# Patient Record
Sex: FEMALE | Race: WHITE | NOT HISPANIC OR LATINO | Employment: OTHER | URBAN - NONMETROPOLITAN AREA
[De-identification: names, ages, dates, MRNs, and addresses within clinical notes are randomized per-mention and may not be internally consistent; named-entity substitution may affect disease eponyms.]

---

## 2017-01-30 ENCOUNTER — DOCTOR'S OFFICE (OUTPATIENT)
Dept: URBAN - NONMETROPOLITAN AREA CLINIC 1 | Facility: CLINIC | Age: 67
Setting detail: OPHTHALMOLOGY
End: 2017-01-30
Payer: COMMERCIAL

## 2017-01-30 DIAGNOSIS — H35.3132: ICD-10-CM

## 2017-01-30 DIAGNOSIS — H40.053: ICD-10-CM

## 2017-01-30 DIAGNOSIS — H43.812: ICD-10-CM

## 2017-01-30 DIAGNOSIS — H02.015: ICD-10-CM

## 2017-01-30 DIAGNOSIS — H35.412: ICD-10-CM

## 2017-01-30 DIAGNOSIS — H35.343: ICD-10-CM

## 2017-01-30 DIAGNOSIS — H02.014: ICD-10-CM

## 2017-01-30 DIAGNOSIS — H43.811: ICD-10-CM

## 2017-01-30 PROCEDURE — 92134 CPTRZ OPH DX IMG PST SGM RTA: CPT | Performed by: OPHTHALMOLOGY

## 2017-01-30 PROCEDURE — 92226 OPHTHALMOSCOPY EXT SUBSEQUENT: CPT | Performed by: OPHTHALMOLOGY

## 2017-01-30 PROCEDURE — 92014 COMPRE OPH EXAM EST PT 1/>: CPT | Performed by: OPHTHALMOLOGY

## 2017-01-30 ASSESSMENT — REFRACTION_MANIFEST
OD_SPHERE: PLANO
OD_CYLINDER: -1.50
OD_VA1: 20/
OD_AXIS: 060
OU_VA: 20/
OD_VA3: 20/
OS_VA1: 20/
OD_VA3: 20/
OD_ADD: +2.50
OD_VA1: 20/
OS_VA2: 20/
OU_VA: 20/
OD_VA3: 20/
OS_CYLINDER: -0.75
OD_VA2: 20/
OD_VA1: 20/25
OS_AXIS: 100
OU_VA: 20/
OD_VA2: 20/
OD_VA2: 20/25
OS_VA3: 20/
OS_SPHERE: -0.25
OS_VA3: 20/
OS_VA1: 20/25
OS_ADD: +2.50
OS_VA2: 20/25
OS_VA2: 20/
OS_VA1: 20/
OS_VA3: 20/

## 2017-01-30 ASSESSMENT — REFRACTION_AUTOREFRACTION
OD_CYLINDER: -1.75
OD_SPHERE: 0.00
OS_CYLINDER: -1.00
OS_AXIS: 102
OD_AXIS: 60
OS_SPHERE: 0.00

## 2017-01-30 ASSESSMENT — REFRACTION_CURRENTRX
OD_OVR_VA: 20/
OS_AXIS: 10
OD_SPHERE: PLANO
OD_OVR_VA: 20/
OS_OVR_VA: 20/
OS_OVR_VA: 20/
OS_SPHERE: -0.75
OD_VPRISM_DIRECTION: SV
OS_VPRISM_DIRECTION: SV
OS_CYLINDER: -0.50
OD_OVR_VA: 20/
OD_CYLINDER: -1.00
OS_OVR_VA: 20/
OD_AXIS: 045

## 2017-01-30 ASSESSMENT — SPHEQUIV_DERIVED
OS_SPHEQUIV: -0.625
OD_SPHEQUIV: -0.875
OS_SPHEQUIV: -0.5

## 2017-01-30 ASSESSMENT — CONFRONTATIONAL VISUAL FIELD TEST (CVF)
OD_FINDINGS: FULL
OS_FINDINGS: FULL

## 2017-01-30 ASSESSMENT — VISUAL ACUITY
OD_BCVA: 20/30-2
OS_BCVA: 20/25+1

## 2017-03-13 ENCOUNTER — DOCTOR'S OFFICE (OUTPATIENT)
Dept: URBAN - NONMETROPOLITAN AREA CLINIC 1 | Facility: CLINIC | Age: 67
Setting detail: OPHTHALMOLOGY
End: 2017-03-13
Payer: COMMERCIAL

## 2017-03-13 DIAGNOSIS — H52.4: ICD-10-CM

## 2017-03-13 DIAGNOSIS — H52.223: ICD-10-CM

## 2017-03-13 PROCEDURE — 92015 DETERMINE REFRACTIVE STATE: CPT | Performed by: OPTOMETRIST

## 2017-03-13 ASSESSMENT — REFRACTION_AUTOREFRACTION
OS_SPHERE: -1.00
OD_CYLINDER: -1.25
OS_AXIS: 74
OS_CYLINDER: -0.75
OD_AXIS: 54
OD_SPHERE: -0.25

## 2017-03-13 ASSESSMENT — REFRACTION_MANIFEST
OD_VA2: 20/
OS_VA2: 20/
OD_VA3: 20/
OD_VA1: 20/
OS_VA1: 20/
OS_VA3: 20/
OS_VA3: 20/
OU_VA: 20/
OS_VA1: 20/
OD_VA3: 20/
OS_VA2: 20/
OD_VA2: 20/
OD_VA1: 20/
OU_VA: 20/

## 2017-03-13 ASSESSMENT — REFRACTION_OUTSIDERX
OD_AXIS: 060
OS_AXIS: 100
OS_VA1: 20/25
OS_SPHERE: -0.25
OS_ADD: +2.50
OD_VA1: 20/25
OD_VA3: 20/
OD_CYLINDER: -1.50
OD_VA2: 20/25
OS_VA2: 20/25
OD_ADD: +2.50
OD_SPHERE: PLANO
OU_VA: 20/25
OS_VA3: 20/
OS_CYLINDER: -0.75

## 2017-03-13 ASSESSMENT — REFRACTION_CURRENTRX
OD_OVR_VA: 20/
OS_OVR_VA: 20/
OD_AXIS: 64
OD_SPHERE: PLANO
OD_OVR_VA: 20/
OD_OVR_VA: 20/
OD_VPRISM_DIRECTION: SV
OS_OVR_VA: 20/
OS_CYLINDER: -0.75
OS_OVR_VA: 20/
OS_SPHERE: -0.25
OS_AXIS: 100
OD_CYLINDER: -1.50
OS_VPRISM_DIRECTION: SV

## 2017-03-13 ASSESSMENT — SPHEQUIV_DERIVED
OS_SPHEQUIV: -1.375
OD_SPHEQUIV: -0.875

## 2017-03-13 ASSESSMENT — VISUAL ACUITY
OD_BCVA: 20/25
OS_BCVA: 20/20-2

## 2017-05-01 ENCOUNTER — OPTICAL OFFICE (OUTPATIENT)
Dept: URBAN - NONMETROPOLITAN AREA CLINIC 4 | Facility: CLINIC | Age: 67
Setting detail: OPHTHALMOLOGY
End: 2017-05-01
Payer: COMMERCIAL

## 2017-05-01 DIAGNOSIS — H52.223: ICD-10-CM

## 2017-05-01 PROCEDURE — V2025 EYEGLASSES DELUX FRAMES: HCPCS | Performed by: OPTOMETRIST

## 2017-05-01 PROCEDURE — V2103 SPHEROCYLINDR 4.00D/12-2.00D: HCPCS | Performed by: OPTOMETRIST

## 2017-05-01 PROCEDURE — V2020 VISION SVCS FRAMES PURCHASES: HCPCS | Performed by: OPTOMETRIST

## 2017-05-01 PROCEDURE — V2762 POLARIZATION, ANY LENS: HCPCS | Performed by: OPTOMETRIST

## 2017-06-05 ENCOUNTER — DOCTOR'S OFFICE (OUTPATIENT)
Dept: URBAN - NONMETROPOLITAN AREA CLINIC 1 | Facility: CLINIC | Age: 67
Setting detail: OPHTHALMOLOGY
End: 2017-06-05
Payer: COMMERCIAL

## 2017-06-05 DIAGNOSIS — H40.053: ICD-10-CM

## 2017-06-05 DIAGNOSIS — H35.412: ICD-10-CM

## 2017-06-05 DIAGNOSIS — H35.3132: ICD-10-CM

## 2017-06-05 DIAGNOSIS — H43.811: ICD-10-CM

## 2017-06-05 DIAGNOSIS — H43.812: ICD-10-CM

## 2017-06-05 DIAGNOSIS — H35.343: ICD-10-CM

## 2017-06-05 PROCEDURE — 92014 COMPRE OPH EXAM EST PT 1/>: CPT | Performed by: OPHTHALMOLOGY

## 2017-06-05 PROCEDURE — 92226 OPHTHALMOSCOPY EXT SUBSEQUENT: CPT | Performed by: OPHTHALMOLOGY

## 2017-06-05 ASSESSMENT — REFRACTION_AUTOREFRACTION
OD_CYLINDER: -1.25
OD_AXIS: 54
OS_AXIS: 74
OS_CYLINDER: -0.75
OD_SPHERE: -0.25
OS_SPHERE: -1.00

## 2017-06-05 ASSESSMENT — REFRACTION_MANIFEST
OS_VA3: 20/
OS_VA1: 20/
OD_VA3: 20/
OD_VA2: 20/
OS_VA3: 20/
OD_VA1: 20/
OS_VA1: 20/
OS_VA2: 20/
OD_VA2: 20/
OS_VA2: 20/
OU_VA: 20/
OU_VA: 20/
OD_VA3: 20/
OD_VA1: 20/

## 2017-06-05 ASSESSMENT — REFRACTION_OUTSIDERX
OS_ADD: +2.50
OS_VA3: 20/
OD_CYLINDER: -1.50
OS_AXIS: 100
OD_VA3: 20/
OD_VA1: 20/25
OS_CYLINDER: -0.75
OD_VA2: 20/25
OS_SPHERE: -0.25
OU_VA: 20/25
OD_SPHERE: PLANO
OS_VA1: 20/25
OD_ADD: +2.50
OS_VA2: 20/25
OD_AXIS: 060

## 2017-06-05 ASSESSMENT — REFRACTION_CURRENTRX
OD_SPHERE: PLANO
OD_OVR_VA: 20/
OD_OVR_VA: 20/
OD_AXIS: 64
OS_VPRISM_DIRECTION: SV
OS_CYLINDER: -0.75
OD_OVR_VA: 20/
OS_OVR_VA: 20/
OD_CYLINDER: -1.50
OS_AXIS: 100
OD_VPRISM_DIRECTION: SV
OS_OVR_VA: 20/
OS_OVR_VA: 20/
OS_SPHERE: -0.25

## 2017-06-05 ASSESSMENT — SPHEQUIV_DERIVED
OS_SPHEQUIV: -1.375
OD_SPHEQUIV: -0.875

## 2017-06-05 ASSESSMENT — VISUAL ACUITY
OS_BCVA: 20/20-1
OD_BCVA: 20/25-2

## 2017-06-05 ASSESSMENT — CONFRONTATIONAL VISUAL FIELD TEST (CVF)
OD_FINDINGS: FULL
OS_FINDINGS: FULL

## 2017-10-09 ENCOUNTER — DOCTOR'S OFFICE (OUTPATIENT)
Dept: URBAN - NONMETROPOLITAN AREA CLINIC 1 | Facility: CLINIC | Age: 67
Setting detail: OPHTHALMOLOGY
End: 2017-10-09
Payer: COMMERCIAL

## 2017-10-09 DIAGNOSIS — H40.003: ICD-10-CM

## 2017-10-09 PROCEDURE — 92082 INTERMEDIATE VISUAL FIELD XM: CPT | Performed by: OPHTHALMOLOGY

## 2017-10-16 ENCOUNTER — DOCTOR'S OFFICE (OUTPATIENT)
Dept: URBAN - NONMETROPOLITAN AREA CLINIC 1 | Facility: CLINIC | Age: 67
Setting detail: OPHTHALMOLOGY
End: 2017-10-16
Payer: COMMERCIAL

## 2017-10-16 DIAGNOSIS — H35.343: ICD-10-CM

## 2017-10-16 DIAGNOSIS — H35.412: ICD-10-CM

## 2017-10-16 DIAGNOSIS — H43.811: ICD-10-CM

## 2017-10-16 DIAGNOSIS — H35.3132: ICD-10-CM

## 2017-10-16 DIAGNOSIS — H40.053: ICD-10-CM

## 2017-10-16 DIAGNOSIS — H43.812: ICD-10-CM

## 2017-10-16 DIAGNOSIS — H43.813: ICD-10-CM

## 2017-10-16 PROCEDURE — 92226 OPHTHALMOSCOPY EXT SUBSEQUENT: CPT | Performed by: OPHTHALMOLOGY

## 2017-10-16 PROCEDURE — 92014 COMPRE OPH EXAM EST PT 1/>: CPT | Performed by: OPHTHALMOLOGY

## 2017-10-16 PROCEDURE — 92134 CPTRZ OPH DX IMG PST SGM RTA: CPT | Performed by: OPHTHALMOLOGY

## 2017-10-16 ASSESSMENT — REFRACTION_OUTSIDERX
OS_VA2: 20/25
OD_VA2: 20/25
OS_ADD: +2.50
OU_VA: 20/25
OD_AXIS: 060
OD_SPHERE: PLANO
OS_VA3: 20/
OD_ADD: +2.50
OS_AXIS: 100
OD_CYLINDER: -1.50
OD_VA1: 20/25
OS_CYLINDER: -0.75
OS_VA1: 20/25
OD_VA3: 20/
OS_SPHERE: -0.25

## 2017-10-16 ASSESSMENT — REFRACTION_MANIFEST
OD_VA1: 20/
OD_VA2: 20/
OS_VA3: 20/
OD_VA3: 20/
OS_VA2: 20/
OS_VA1: 20/
OD_VA2: 20/
OS_VA1: 20/
OD_VA3: 20/
OU_VA: 20/
OS_VA2: 20/
OS_VA3: 20/
OD_VA1: 20/
OU_VA: 20/

## 2017-10-16 ASSESSMENT — REFRACTION_CURRENTRX
OS_SPHERE: -0.25
OS_VPRISM_DIRECTION: SV
OD_CYLINDER: -1.50
OS_CYLINDER: -0.75
OD_OVR_VA: 20/
OD_OVR_VA: 20/
OD_SPHERE: PLANO
OD_VPRISM_DIRECTION: SV
OS_OVR_VA: 20/
OS_AXIS: 100
OS_OVR_VA: 20/
OD_AXIS: 64
OD_OVR_VA: 20/
OS_OVR_VA: 20/

## 2017-10-16 ASSESSMENT — SPHEQUIV_DERIVED
OS_SPHEQUIV: -1.375
OD_SPHEQUIV: -0.875

## 2017-10-16 ASSESSMENT — REFRACTION_AUTOREFRACTION
OS_AXIS: 74
OD_AXIS: 54
OD_CYLINDER: -1.25
OS_SPHERE: -1.00
OS_CYLINDER: -0.75
OD_SPHERE: -0.25

## 2017-10-16 ASSESSMENT — VISUAL ACUITY
OD_BCVA: 20/25-2
OS_BCVA: 20/25

## 2017-10-16 ASSESSMENT — CONFRONTATIONAL VISUAL FIELD TEST (CVF)
OD_FINDINGS: FULL
OS_FINDINGS: FULL

## 2018-04-16 ENCOUNTER — DOCTOR'S OFFICE (OUTPATIENT)
Dept: URBAN - NONMETROPOLITAN AREA CLINIC 1 | Facility: CLINIC | Age: 68
Setting detail: OPHTHALMOLOGY
End: 2018-04-16
Payer: COMMERCIAL

## 2018-04-16 DIAGNOSIS — H43.813: ICD-10-CM

## 2018-04-16 DIAGNOSIS — H35.3132: ICD-10-CM

## 2018-04-16 DIAGNOSIS — H35.343: ICD-10-CM

## 2018-04-16 DIAGNOSIS — H43.811: ICD-10-CM

## 2018-04-16 DIAGNOSIS — H35.412: ICD-10-CM

## 2018-04-16 DIAGNOSIS — H43.812: ICD-10-CM

## 2018-04-16 DIAGNOSIS — H40.053: ICD-10-CM

## 2018-04-16 PROCEDURE — 92014 COMPRE OPH EXAM EST PT 1/>: CPT | Performed by: OPHTHALMOLOGY

## 2018-04-16 PROCEDURE — 92226 OPHTHALMOSCOPY EXT SUBSEQUENT: CPT | Performed by: OPHTHALMOLOGY

## 2018-04-16 PROCEDURE — 92134 CPTRZ OPH DX IMG PST SGM RTA: CPT | Performed by: OPHTHALMOLOGY

## 2018-04-16 ASSESSMENT — REFRACTION_MANIFEST
OU_VA: 20/
OS_VA2: 20/
OS_VA3: 20/
OS_VA3: 20/
OS_VA1: 20/
OU_VA: 20/
OD_VA3: 20/
OD_VA2: 20/
OS_VA1: 20/
OD_VA1: 20/
OS_VA2: 20/
OD_VA2: 20/
OD_VA1: 20/
OD_VA3: 20/

## 2018-04-16 ASSESSMENT — REFRACTION_CURRENTRX
OS_OVR_VA: 20/
OS_VPRISM_DIRECTION: SV
OD_AXIS: 64
OS_AXIS: 100
OS_OVR_VA: 20/
OS_OVR_VA: 20/
OS_SPHERE: -0.25
OS_CYLINDER: -0.75
OD_CYLINDER: -1.50
OD_OVR_VA: 20/
OD_SPHERE: PLANO
OD_OVR_VA: 20/
OD_VPRISM_DIRECTION: SV
OD_OVR_VA: 20/

## 2018-04-16 ASSESSMENT — SPHEQUIV_DERIVED
OD_SPHEQUIV: -0.875
OS_SPHEQUIV: -1.375

## 2018-04-16 ASSESSMENT — CONFRONTATIONAL VISUAL FIELD TEST (CVF)
OS_FINDINGS: FULL
OD_FINDINGS: FULL

## 2018-04-16 ASSESSMENT — REFRACTION_OUTSIDERX
OD_VA1: 20/25
OD_ADD: +2.50
OD_CYLINDER: -1.50
OS_SPHERE: -0.25
OD_VA3: 20/
OD_AXIS: 060
OS_ADD: +2.50
OU_VA: 20/25
OS_CYLINDER: -0.75
OS_AXIS: 100
OD_SPHERE: PLANO
OS_VA2: 20/25
OS_VA3: 20/
OS_VA1: 20/25
OD_VA2: 20/25

## 2018-04-16 ASSESSMENT — REFRACTION_AUTOREFRACTION
OD_AXIS: 54
OS_CYLINDER: -0.75
OD_SPHERE: -0.25
OD_CYLINDER: -1.25
OS_AXIS: 74
OS_SPHERE: -1.00

## 2018-04-16 ASSESSMENT — VISUAL ACUITY
OS_BCVA: 20/20-2
OD_BCVA: 20/25-2

## 2018-08-17 ENCOUNTER — DOCTOR'S OFFICE (OUTPATIENT)
Dept: URBAN - NONMETROPOLITAN AREA CLINIC 1 | Facility: CLINIC | Age: 68
Setting detail: OPHTHALMOLOGY
End: 2018-08-17
Payer: MEDICARE

## 2018-08-17 DIAGNOSIS — H40.053: ICD-10-CM

## 2018-08-17 PROCEDURE — 92133 CPTRZD OPH DX IMG PST SGM ON: CPT | Performed by: OPHTHALMOLOGY

## 2018-09-13 ENCOUNTER — DOCTOR'S OFFICE (OUTPATIENT)
Dept: URBAN - NONMETROPOLITAN AREA CLINIC 1 | Facility: CLINIC | Age: 68
Setting detail: OPHTHALMOLOGY
End: 2018-09-13
Payer: MEDICARE

## 2018-09-13 DIAGNOSIS — H35.3132: ICD-10-CM

## 2018-09-13 DIAGNOSIS — H43.811: ICD-10-CM

## 2018-09-13 DIAGNOSIS — H43.812: ICD-10-CM

## 2018-09-13 DIAGNOSIS — H35.412: ICD-10-CM

## 2018-09-13 DIAGNOSIS — H43.813: ICD-10-CM

## 2018-09-13 DIAGNOSIS — H40.053: ICD-10-CM

## 2018-09-13 PROCEDURE — 92014 COMPRE OPH EXAM EST PT 1/>: CPT | Performed by: OPHTHALMOLOGY

## 2018-09-13 PROCEDURE — 92134 CPTRZ OPH DX IMG PST SGM RTA: CPT | Performed by: OPHTHALMOLOGY

## 2018-09-13 PROCEDURE — 92226 OPHTHALMOSCOPY EXT SUBSEQUENT: CPT | Performed by: OPHTHALMOLOGY

## 2018-09-13 ASSESSMENT — REFRACTION_MANIFEST
OU_VA: 20/25
OS_VA3: 20/
OS_VA3: 20/
OS_ADD: +2.50
OU_VA: 20/
OS_VA2: 20/
OS_CYLINDER: -0.75
OS_VA1: 20/25
OS_VA1: 20/
OD_VA3: 20/
OD_VA2: 20/25
OS_SPHERE: -0.25
OD_ADD: +2.50
OD_AXIS: 060
OD_VA1: 20/25
OS_AXIS: 100
OD_SPHERE: PLANO
OS_VA2: 20/25
OD_VA3: 20/
OD_VA2: 20/
OD_VA1: 20/
OD_CYLINDER: -1.50

## 2018-09-13 ASSESSMENT — REFRACTION_AUTOREFRACTION
OD_SPHERE: -0.25
OD_AXIS: 54
OS_AXIS: 74
OS_SPHERE: -1.00
OS_CYLINDER: -0.75
OD_CYLINDER: -1.25

## 2018-09-13 ASSESSMENT — REFRACTION_CURRENTRX
OS_OVR_VA: 20/
OS_OVR_VA: 20/
OD_OVR_VA: 20/
OS_SPHERE: -0.25
OS_AXIS: 100
OD_OVR_VA: 20/
OS_VPRISM_DIRECTION: SV
OD_CYLINDER: -1.50
OD_SPHERE: PLANO
OD_AXIS: 64
OS_OVR_VA: 20/
OS_CYLINDER: -0.75
OD_VPRISM_DIRECTION: SV
OD_OVR_VA: 20/

## 2018-09-13 ASSESSMENT — SPHEQUIV_DERIVED
OS_SPHEQUIV: -1.375
OS_SPHEQUIV: -0.625
OD_SPHEQUIV: -0.875

## 2018-09-13 ASSESSMENT — VISUAL ACUITY
OS_BCVA: 20/20-2
OD_BCVA: 20/50

## 2018-09-13 ASSESSMENT — CONFRONTATIONAL VISUAL FIELD TEST (CVF)
OS_FINDINGS: FULL
OD_FINDINGS: FULL

## 2018-12-03 ENCOUNTER — DOCTOR'S OFFICE (OUTPATIENT)
Dept: URBAN - NONMETROPOLITAN AREA CLINIC 1 | Facility: CLINIC | Age: 68
Setting detail: OPHTHALMOLOGY
End: 2018-12-03
Payer: MEDICARE

## 2018-12-03 DIAGNOSIS — H35.3132: ICD-10-CM

## 2018-12-03 DIAGNOSIS — H04.123: ICD-10-CM

## 2018-12-03 DIAGNOSIS — H40.053: ICD-10-CM

## 2018-12-03 DIAGNOSIS — H35.343: ICD-10-CM

## 2018-12-03 DIAGNOSIS — H43.811: ICD-10-CM

## 2018-12-03 DIAGNOSIS — H43.812: ICD-10-CM

## 2018-12-03 PROCEDURE — 92014 COMPRE OPH EXAM EST PT 1/>: CPT | Performed by: OPHTHALMOLOGY

## 2018-12-03 PROCEDURE — 83861 MICROFLUID ANALY TEARS: CPT | Performed by: OPHTHALMOLOGY

## 2018-12-03 PROCEDURE — 92134 CPTRZ OPH DX IMG PST SGM RTA: CPT | Performed by: OPHTHALMOLOGY

## 2018-12-03 PROCEDURE — 92226 OPHTHALMOSCOPY EXT SUBSEQUENT: CPT | Performed by: OPHTHALMOLOGY

## 2018-12-03 ASSESSMENT — REFRACTION_AUTOREFRACTION
OD_AXIS: 54
OS_CYLINDER: -0.75
OS_SPHERE: -1.00
OD_CYLINDER: -1.25
OD_SPHERE: -0.25
OS_AXIS: 74

## 2018-12-03 ASSESSMENT — REFRACTION_MANIFEST
OD_ADD: +2.50
OS_VA2: 20/
OS_VA2: 20/25
OS_ADD: +2.50
OS_SPHERE: -0.25
OD_VA3: 20/
OS_VA1: 20/
OD_VA1: 20/
OD_VA3: 20/
OD_AXIS: 060
OD_CYLINDER: -1.50
OS_VA3: 20/
OD_VA1: 20/25
OS_AXIS: 100
OU_VA: 20/
OD_VA2: 20/
OD_SPHERE: PLANO
OS_VA1: 20/25
OS_CYLINDER: -0.75
OU_VA: 20/25
OS_VA3: 20/
OD_VA2: 20/25

## 2018-12-03 ASSESSMENT — CONFRONTATIONAL VISUAL FIELD TEST (CVF)
OS_FINDINGS: FULL
OD_FINDINGS: FULL

## 2018-12-03 ASSESSMENT — REFRACTION_CURRENTRX
OS_OVR_VA: 20/
OS_VPRISM_DIRECTION: SV
OD_CYLINDER: -1.50
OD_VPRISM_DIRECTION: SV
OD_OVR_VA: 20/
OD_OVR_VA: 20/
OS_OVR_VA: 20/
OS_OVR_VA: 20/
OD_OVR_VA: 20/
OS_CYLINDER: -0.75
OD_AXIS: 64
OS_SPHERE: -0.25
OS_AXIS: 100
OD_SPHERE: PLANO

## 2018-12-03 ASSESSMENT — SPHEQUIV_DERIVED
OD_SPHEQUIV: -0.875
OS_SPHEQUIV: -0.625
OS_SPHEQUIV: -1.375

## 2018-12-03 ASSESSMENT — VISUAL ACUITY
OS_BCVA: 20/20-2
OD_BCVA: 20/25

## 2019-03-28 ENCOUNTER — DOCTOR'S OFFICE (OUTPATIENT)
Dept: URBAN - NONMETROPOLITAN AREA CLINIC 1 | Facility: CLINIC | Age: 69
Setting detail: OPHTHALMOLOGY
End: 2019-03-28
Payer: MEDICARE

## 2019-03-28 DIAGNOSIS — H40.053: ICD-10-CM

## 2019-03-28 PROCEDURE — 92083 EXTENDED VISUAL FIELD XM: CPT | Performed by: OPHTHALMOLOGY

## 2019-05-30 ENCOUNTER — DOCTOR'S OFFICE (OUTPATIENT)
Dept: URBAN - NONMETROPOLITAN AREA CLINIC 1 | Facility: CLINIC | Age: 69
Setting detail: OPHTHALMOLOGY
End: 2019-05-30
Payer: MEDICARE

## 2019-05-30 DIAGNOSIS — H43.811: ICD-10-CM

## 2019-05-30 DIAGNOSIS — H35.343: ICD-10-CM

## 2019-05-30 DIAGNOSIS — H43.813: ICD-10-CM

## 2019-05-30 DIAGNOSIS — H40.053: ICD-10-CM

## 2019-05-30 DIAGNOSIS — H43.812: ICD-10-CM

## 2019-05-30 DIAGNOSIS — H35.3132: ICD-10-CM

## 2019-05-30 DIAGNOSIS — H04.123: ICD-10-CM

## 2019-05-30 DIAGNOSIS — H35.372: ICD-10-CM

## 2019-05-30 PROCEDURE — 92134 CPTRZ OPH DX IMG PST SGM RTA: CPT | Performed by: OPHTHALMOLOGY

## 2019-05-30 PROCEDURE — 83861 MICROFLUID ANALY TEARS: CPT | Performed by: OPHTHALMOLOGY

## 2019-05-30 PROCEDURE — 92014 COMPRE OPH EXAM EST PT 1/>: CPT | Performed by: OPHTHALMOLOGY

## 2019-05-30 PROCEDURE — 92226 OPHTHALMOSCOPY EXT SUBSEQUENT: CPT | Performed by: OPHTHALMOLOGY

## 2019-05-30 ASSESSMENT — REFRACTION_MANIFEST
OS_SPHERE: -0.25
OD_CYLINDER: -1.50
OS_VA2: 20/25
OS_VA1: 20/
OD_SPHERE: PLANO
OS_AXIS: 100
OD_VA3: 20/
OS_CYLINDER: -0.75
OD_VA2: 20/
OS_ADD: +2.50
OD_VA1: 20/25
OS_VA1: 20/25
OU_VA: 20/
OD_AXIS: 060
OD_VA3: 20/
OS_VA3: 20/
OD_VA2: 20/25
OS_VA3: 20/
OD_ADD: +2.50
OS_VA2: 20/
OD_VA1: 20/
OU_VA: 20/25

## 2019-05-30 ASSESSMENT — CONFRONTATIONAL VISUAL FIELD TEST (CVF)
OD_FINDINGS: FULL
OS_FINDINGS: FULL

## 2019-05-30 ASSESSMENT — REFRACTION_CURRENTRX
OD_VPRISM_DIRECTION: SV
OD_OVR_VA: 20/
OS_OVR_VA: 20/
OD_CYLINDER: -1.50
OS_CYLINDER: -0.75
OD_OVR_VA: 20/
OS_OVR_VA: 20/
OD_OVR_VA: 20/
OD_SPHERE: PLANO
OS_OVR_VA: 20/
OD_AXIS: 64
OS_VPRISM_DIRECTION: SV
OS_SPHERE: -0.25
OS_AXIS: 100

## 2019-05-30 ASSESSMENT — REFRACTION_AUTOREFRACTION
OD_AXIS: 54
OD_SPHERE: -0.25
OS_CYLINDER: -0.75
OS_SPHERE: -1.00
OD_CYLINDER: -1.25
OS_AXIS: 74

## 2019-05-30 ASSESSMENT — SPHEQUIV_DERIVED
OS_SPHEQUIV: -0.625
OD_SPHEQUIV: -0.875
OS_SPHEQUIV: -1.375

## 2019-05-30 ASSESSMENT — VISUAL ACUITY
OD_BCVA: 20/30
OS_BCVA: 20/20+1

## 2019-08-06 ENCOUNTER — DOCTOR'S OFFICE (OUTPATIENT)
Dept: URBAN - NONMETROPOLITAN AREA CLINIC 1 | Facility: CLINIC | Age: 69
Setting detail: OPHTHALMOLOGY
End: 2019-08-06
Payer: MEDICARE

## 2019-08-06 DIAGNOSIS — H40.053: ICD-10-CM

## 2019-08-06 PROCEDURE — 92133 CPTRZD OPH DX IMG PST SGM ON: CPT | Performed by: OPHTHALMOLOGY

## 2019-10-24 ENCOUNTER — DOCTOR'S OFFICE (OUTPATIENT)
Dept: URBAN - NONMETROPOLITAN AREA CLINIC 1 | Facility: CLINIC | Age: 69
Setting detail: OPHTHALMOLOGY
End: 2019-10-24
Payer: MEDICARE

## 2019-10-24 DIAGNOSIS — H35.372: ICD-10-CM

## 2019-10-24 DIAGNOSIS — H43.811: ICD-10-CM

## 2019-10-24 DIAGNOSIS — H40.053: ICD-10-CM

## 2019-10-24 DIAGNOSIS — H43.812: ICD-10-CM

## 2019-10-24 DIAGNOSIS — H35.343: ICD-10-CM

## 2019-10-24 DIAGNOSIS — H35.3132: ICD-10-CM

## 2019-10-24 PROCEDURE — 92014 COMPRE OPH EXAM EST PT 1/>: CPT | Performed by: OPHTHALMOLOGY

## 2019-10-24 PROCEDURE — 92134 CPTRZ OPH DX IMG PST SGM RTA: CPT | Performed by: OPHTHALMOLOGY

## 2019-10-24 PROCEDURE — 92226 OPHTHALMOSCOPY EXT SUBSEQUENT: CPT | Performed by: OPHTHALMOLOGY

## 2019-10-24 ASSESSMENT — REFRACTION_MANIFEST
OD_VA1: 20/25
OU_VA: 20/
OS_ADD: +2.50
OD_AXIS: 060
OS_VA3: 20/
OD_VA2: 20/25
OD_VA2: 20/
OU_VA: 20/25
OS_AXIS: 100
OS_VA2: 20/
OD_VA3: 20/
OS_SPHERE: -0.25
OD_VA3: 20/
OD_ADD: +2.50
OD_VA1: 20/
OS_VA1: 20/
OS_VA1: 20/25
OS_CYLINDER: -0.75
OS_VA2: 20/25
OD_SPHERE: PLANO
OD_CYLINDER: -1.50
OS_VA3: 20/

## 2019-10-24 ASSESSMENT — REFRACTION_AUTOREFRACTION
OD_AXIS: 54
OD_CYLINDER: -1.25
OS_AXIS: 74
OD_SPHERE: -0.25
OS_SPHERE: -1.00
OS_CYLINDER: -0.75

## 2019-10-24 ASSESSMENT — REFRACTION_CURRENTRX
OS_OVR_VA: 20/
OS_AXIS: 100
OD_OVR_VA: 20/
OS_OVR_VA: 20/
OS_CYLINDER: -0.75
OS_SPHERE: -0.25
OD_VPRISM_DIRECTION: SV
OD_CYLINDER: -1.50
OD_SPHERE: PLANO
OS_VPRISM_DIRECTION: SV
OD_OVR_VA: 20/
OS_OVR_VA: 20/
OD_OVR_VA: 20/
OD_AXIS: 64

## 2019-10-24 ASSESSMENT — SPHEQUIV_DERIVED
OD_SPHEQUIV: -0.875
OS_SPHEQUIV: -1.375
OS_SPHEQUIV: -0.625

## 2019-10-24 ASSESSMENT — CONFRONTATIONAL VISUAL FIELD TEST (CVF)
OD_FINDINGS: FULL
OS_FINDINGS: FULL

## 2019-10-24 ASSESSMENT — VISUAL ACUITY
OS_BCVA: 20/20-2
OD_BCVA: 20/25

## 2020-05-18 ENCOUNTER — DOCTOR'S OFFICE (OUTPATIENT)
Dept: URBAN - NONMETROPOLITAN AREA CLINIC 1 | Facility: CLINIC | Age: 70
Setting detail: OPHTHALMOLOGY
End: 2020-05-18
Payer: MEDICARE

## 2020-05-18 DIAGNOSIS — H35.372: ICD-10-CM

## 2020-05-18 DIAGNOSIS — H35.343: ICD-10-CM

## 2020-05-18 DIAGNOSIS — H35.3132: ICD-10-CM

## 2020-05-18 DIAGNOSIS — H40.053: ICD-10-CM

## 2020-05-18 DIAGNOSIS — H43.813: ICD-10-CM

## 2020-05-18 PROCEDURE — 92201 OPSCPY EXTND RTA DRAW UNI/BI: CPT | Performed by: OPHTHALMOLOGY

## 2020-05-18 PROCEDURE — 92134 CPTRZ OPH DX IMG PST SGM RTA: CPT | Performed by: OPHTHALMOLOGY

## 2020-05-18 PROCEDURE — 92014 COMPRE OPH EXAM EST PT 1/>: CPT | Performed by: OPHTHALMOLOGY

## 2020-05-18 ASSESSMENT — REFRACTION_MANIFEST
OD_VA1: 20/25
OU_VA: 20/25
OS_VA2: 20/25
OS_ADD: +2.50
OD_AXIS: 060
OS_CYLINDER: -0.75
OS_AXIS: 100
OD_ADD: +2.50
OD_VA2: 20/25
OS_SPHERE: -0.25
OD_CYLINDER: -1.50
OS_VA1: 20/25
OD_SPHERE: PLANO

## 2020-05-18 ASSESSMENT — SPHEQUIV_DERIVED
OS_SPHEQUIV: -0.625
OS_SPHEQUIV: -1.375
OD_SPHEQUIV: -0.875

## 2020-05-18 ASSESSMENT — REFRACTION_CURRENTRX
OD_SPHERE: PLANO
OS_OVR_VA: 20/
OS_SPHERE: -0.25
OD_OVR_VA: 20/
OD_AXIS: 64
OS_AXIS: 100
OD_CYLINDER: -1.50
OS_CYLINDER: -0.75
OD_VPRISM_DIRECTION: SV
OS_VPRISM_DIRECTION: SV

## 2020-05-18 ASSESSMENT — VISUAL ACUITY
OD_BCVA: 20/25-2
OS_BCVA: 20/20-1

## 2020-05-18 ASSESSMENT — REFRACTION_AUTOREFRACTION
OD_SPHERE: -0.25
OS_SPHERE: -1.00
OD_AXIS: 54
OS_AXIS: 74
OS_CYLINDER: -0.75
OD_CYLINDER: -1.25

## 2020-05-18 ASSESSMENT — CONFRONTATIONAL VISUAL FIELD TEST (CVF)
OS_FINDINGS: FULL
OD_FINDINGS: FULL

## 2020-06-19 ENCOUNTER — DOCTOR'S OFFICE (OUTPATIENT)
Dept: URBAN - NONMETROPOLITAN AREA CLINIC 1 | Facility: CLINIC | Age: 70
Setting detail: OPHTHALMOLOGY
End: 2020-06-19
Payer: MEDICARE

## 2020-06-19 DIAGNOSIS — H04.123: ICD-10-CM

## 2020-06-19 DIAGNOSIS — H35.3132: ICD-10-CM

## 2020-06-19 DIAGNOSIS — H40.053: ICD-10-CM

## 2020-06-19 PROCEDURE — 92083 EXTENDED VISUAL FIELD XM: CPT | Performed by: OPHTHALMOLOGY

## 2020-06-19 PROCEDURE — 92012 INTRM OPH EXAM EST PATIENT: CPT | Performed by: OPHTHALMOLOGY

## 2020-06-19 PROCEDURE — 76514 ECHO EXAM OF EYE THICKNESS: CPT | Performed by: OPHTHALMOLOGY

## 2020-06-19 ASSESSMENT — REFRACTION_CURRENTRX
OS_VPRISM_DIRECTION: SV
OS_AXIS: 093
OD_SPHERE: PLANO
OS_CYLINDER: -0.75
OD_VPRISM_DIRECTION: SV
OD_CYLINDER: -1.50
OD_OVR_VA: 20/
OS_SPHERE: -0.25
OD_AXIS: 61
OS_OVR_VA: 20/

## 2020-06-19 ASSESSMENT — REFRACTION_MANIFEST
OD_ADD: +2.50
OS_VA2: 20/25
OU_VA: 20/25
OS_CYLINDER: -0.75
OD_SPHERE: PLANO
OD_VA2: 20/25
OD_CYLINDER: -1.50
OD_AXIS: 060
OS_VA1: 20/25
OS_ADD: +2.50
OS_SPHERE: -0.25
OD_VA1: 20/25
OS_AXIS: 100

## 2020-06-19 ASSESSMENT — VISUAL ACUITY
OS_BCVA: 20/25+2
OD_BCVA: 20/30+2

## 2020-06-19 ASSESSMENT — SPHEQUIV_DERIVED
OS_SPHEQUIV: -0.625
OS_SPHEQUIV: -1.375
OD_SPHEQUIV: -0.875

## 2020-06-19 ASSESSMENT — REFRACTION_AUTOREFRACTION
OD_AXIS: 54
OS_CYLINDER: -0.75
OD_SPHERE: -0.25
OD_CYLINDER: -1.25
OS_SPHERE: -1.00
OS_AXIS: 74

## 2020-06-19 ASSESSMENT — PACHYMETRY
OS_CT_CORRECTION: -1
OS_CT_UM: 553
OD_CT_UM: 564
OD_CT_CORRECTION: -1

## 2020-06-19 ASSESSMENT — CONFRONTATIONAL VISUAL FIELD TEST (CVF)
OD_FINDINGS: FULL
OS_FINDINGS: FULL

## 2020-09-17 ENCOUNTER — DOCTOR'S OFFICE (OUTPATIENT)
Dept: URBAN - NONMETROPOLITAN AREA CLINIC 1 | Facility: CLINIC | Age: 70
Setting detail: OPHTHALMOLOGY
End: 2020-09-17
Payer: MEDICARE

## 2020-09-17 VITALS — HEIGHT: 55 IN

## 2020-09-17 DIAGNOSIS — H35.3132: ICD-10-CM

## 2020-09-17 DIAGNOSIS — H35.372: ICD-10-CM

## 2020-09-17 DIAGNOSIS — H43.812: ICD-10-CM

## 2020-09-17 DIAGNOSIS — H43.822: ICD-10-CM

## 2020-09-17 DIAGNOSIS — H40.053: ICD-10-CM

## 2020-09-17 DIAGNOSIS — H43.811: ICD-10-CM

## 2020-09-17 DIAGNOSIS — H04.123: ICD-10-CM

## 2020-09-17 PROCEDURE — 92134 CPTRZ OPH DX IMG PST SGM RTA: CPT | Performed by: OPHTHALMOLOGY

## 2020-09-17 PROCEDURE — 92201 OPSCPY EXTND RTA DRAW UNI/BI: CPT | Performed by: OPHTHALMOLOGY

## 2020-09-17 PROCEDURE — 92014 COMPRE OPH EXAM EST PT 1/>: CPT | Performed by: OPHTHALMOLOGY

## 2020-09-17 ASSESSMENT — VISUAL ACUITY
OS_BCVA: 20/25+2
OD_BCVA: 20/40-2

## 2020-09-17 ASSESSMENT — REFRACTION_AUTOREFRACTION
OD_SPHERE: -0.25
OD_CYLINDER: -1.25
OD_AXIS: 54
OS_CYLINDER: -0.75
OS_SPHERE: -1.00
OS_AXIS: 74

## 2020-09-17 ASSESSMENT — REFRACTION_CURRENTRX
OS_CYLINDER: -0.75
OS_VPRISM_DIRECTION: SV
OS_AXIS: 093
OD_SPHERE: PLANO
OS_OVR_VA: 20/
OD_AXIS: 61
OD_CYLINDER: -1.50
OD_OVR_VA: 20/
OS_SPHERE: -0.25
OD_VPRISM_DIRECTION: SV

## 2020-09-17 ASSESSMENT — REFRACTION_MANIFEST
OU_VA: 20/25
OD_VA1: 20/25
OS_CYLINDER: -0.75
OD_ADD: +2.50
OS_ADD: +2.50
OD_SPHERE: PLANO
OD_CYLINDER: -1.50
OS_SPHERE: -0.25
OD_AXIS: 060
OS_VA2: 20/25
OS_AXIS: 100
OS_VA1: 20/25
OD_VA2: 20/25

## 2020-09-17 ASSESSMENT — SPHEQUIV_DERIVED
OD_SPHEQUIV: -0.875
OS_SPHEQUIV: -0.625
OS_SPHEQUIV: -1.375

## 2020-09-17 ASSESSMENT — CONFRONTATIONAL VISUAL FIELD TEST (CVF)
OD_FINDINGS: FULL
OS_FINDINGS: FULL

## 2020-10-01 ENCOUNTER — DOCTOR'S OFFICE (OUTPATIENT)
Dept: URBAN - NONMETROPOLITAN AREA CLINIC 1 | Facility: CLINIC | Age: 70
Setting detail: OPHTHALMOLOGY
End: 2020-10-01
Payer: MEDICARE

## 2020-10-01 ENCOUNTER — RX ONLY (RX ONLY)
Age: 70
End: 2020-10-01

## 2020-10-01 VITALS — HEIGHT: 55 IN

## 2020-10-01 DIAGNOSIS — H35.3132: ICD-10-CM

## 2020-10-01 DIAGNOSIS — H43.813: ICD-10-CM

## 2020-10-01 DIAGNOSIS — H35.372: ICD-10-CM

## 2020-10-01 PROCEDURE — 92014 COMPRE OPH EXAM EST PT 1/>: CPT | Performed by: OPHTHALMOLOGY

## 2020-10-01 PROCEDURE — 92250 FUNDUS PHOTOGRAPHY W/I&R: CPT | Performed by: OPHTHALMOLOGY

## 2020-10-01 PROCEDURE — 92235 FLUORESCEIN ANGRPH MLTIFRAME: CPT | Performed by: OPHTHALMOLOGY

## 2020-10-01 ASSESSMENT — REFRACTION_CURRENTRX
OS_SPHERE: -0.25
OS_OVR_VA: 20/
OS_AXIS: 093
OD_SPHERE: PLANO
OS_VPRISM_DIRECTION: SV
OS_CYLINDER: -0.75
OD_CYLINDER: -1.50
OD_AXIS: 61
OD_OVR_VA: 20/
OD_VPRISM_DIRECTION: SV

## 2020-10-01 ASSESSMENT — REFRACTION_AUTOREFRACTION
OD_SPHERE: -0.25
OS_AXIS: 74
OD_AXIS: 54
OS_SPHERE: -1.00
OS_CYLINDER: -0.75
OD_CYLINDER: -1.25

## 2020-10-01 ASSESSMENT — REFRACTION_MANIFEST
OU_VA: 20/25
OS_VA1: 20/25
OS_ADD: +2.50
OD_ADD: +2.50
OD_AXIS: 060
OD_SPHERE: PLANO
OD_VA1: 20/25
OD_CYLINDER: -1.50
OD_VA2: 20/25
OS_VA2: 20/25
OS_CYLINDER: -0.75
OS_SPHERE: -0.25
OS_AXIS: 100

## 2020-10-01 ASSESSMENT — CONFRONTATIONAL VISUAL FIELD TEST (CVF)
OD_FINDINGS: FULL
OS_FINDINGS: FULL

## 2020-10-01 ASSESSMENT — SPHEQUIV_DERIVED
OS_SPHEQUIV: -1.375
OS_SPHEQUIV: -0.625
OD_SPHEQUIV: -0.875

## 2020-10-01 ASSESSMENT — VISUAL ACUITY
OS_BCVA: 20/25+1
OD_BCVA: 20/40

## 2020-11-05 ENCOUNTER — DOCTOR'S OFFICE (OUTPATIENT)
Dept: URBAN - NONMETROPOLITAN AREA CLINIC 1 | Facility: CLINIC | Age: 70
Setting detail: OPHTHALMOLOGY
End: 2020-11-05
Payer: MEDICARE

## 2020-11-05 DIAGNOSIS — H43.813: ICD-10-CM

## 2020-11-05 DIAGNOSIS — H04.123: ICD-10-CM

## 2020-11-05 DIAGNOSIS — H35.372: ICD-10-CM

## 2020-11-05 DIAGNOSIS — H35.3132: ICD-10-CM

## 2020-11-05 DIAGNOSIS — H40.053: ICD-10-CM

## 2020-11-05 PROBLEM — H02.015: Status: RESOLVED | Noted: 2017-03-13 | Resolved: 2020-11-05

## 2020-11-05 PROBLEM — H02.014: Status: RESOLVED | Noted: 2017-03-13 | Resolved: 2020-11-05

## 2020-11-05 PROCEDURE — 92014 COMPRE OPH EXAM EST PT 1/>: CPT | Performed by: OPHTHALMOLOGY

## 2020-11-05 PROCEDURE — 92201 OPSCPY EXTND RTA DRAW UNI/BI: CPT | Performed by: OPHTHALMOLOGY

## 2020-11-05 PROCEDURE — 92134 CPTRZ OPH DX IMG PST SGM RTA: CPT | Performed by: OPHTHALMOLOGY

## 2020-11-05 ASSESSMENT — REFRACTION_CURRENTRX
OS_SPHERE: -0.25
OS_OVR_VA: 20/
OD_VPRISM_DIRECTION: SV
OS_VPRISM_DIRECTION: SV
OD_CYLINDER: -1.50
OD_OVR_VA: 20/
OS_CYLINDER: -0.75
OS_AXIS: 093
OD_SPHERE: PLANO
OD_AXIS: 61

## 2020-11-05 ASSESSMENT — REFRACTION_MANIFEST
OS_CYLINDER: -0.75
OS_VA1: 20/25
OD_VA2: 20/25
OS_ADD: +2.50
OU_VA: 20/25
OD_AXIS: 060
OS_AXIS: 100
OD_SPHERE: PLANO
OS_VA2: 20/25
OD_VA1: 20/25
OD_ADD: +2.50
OD_CYLINDER: -1.50
OS_SPHERE: -0.25

## 2020-11-05 ASSESSMENT — VISUAL ACUITY
OD_BCVA: 20/60-1
OS_BCVA: 20/25-1

## 2020-11-05 ASSESSMENT — REFRACTION_AUTOREFRACTION
OD_CYLINDER: -1.25
OS_CYLINDER: -0.75
OD_AXIS: 54
OS_AXIS: 74
OS_SPHERE: -1.00
OD_SPHERE: -0.25

## 2020-11-05 ASSESSMENT — PACHYMETRY
OD_CT_CORRECTION: -1
OD_CT_UM: 558
OS_CT_CORRECTION: -2
OS_CT_UM: 577

## 2020-11-05 ASSESSMENT — SPHEQUIV_DERIVED
OS_SPHEQUIV: -0.625
OS_SPHEQUIV: -1.375
OD_SPHEQUIV: -0.875

## 2020-11-05 ASSESSMENT — TONOMETRY
OS_IOP_MMHG: 19
OD_IOP_MMHG: 18

## 2020-11-05 ASSESSMENT — CONFRONTATIONAL VISUAL FIELD TEST (CVF)
OS_FINDINGS: FULL
OD_FINDINGS: FULL

## 2020-12-10 ENCOUNTER — DOCTOR'S OFFICE (OUTPATIENT)
Dept: URBAN - NONMETROPOLITAN AREA CLINIC 1 | Facility: CLINIC | Age: 70
Setting detail: OPHTHALMOLOGY
End: 2020-12-10
Payer: MEDICARE

## 2020-12-10 DIAGNOSIS — H35.343: ICD-10-CM

## 2020-12-10 DIAGNOSIS — H43.811: ICD-10-CM

## 2020-12-10 DIAGNOSIS — H35.3132: ICD-10-CM

## 2020-12-10 DIAGNOSIS — H04.123: ICD-10-CM

## 2020-12-10 DIAGNOSIS — H35.412: ICD-10-CM

## 2020-12-10 DIAGNOSIS — H43.812: ICD-10-CM

## 2020-12-10 DIAGNOSIS — H43.822: ICD-10-CM

## 2020-12-10 DIAGNOSIS — H35.372: ICD-10-CM

## 2020-12-10 DIAGNOSIS — H40.053: ICD-10-CM

## 2020-12-10 PROCEDURE — 92134 CPTRZ OPH DX IMG PST SGM RTA: CPT | Performed by: OPHTHALMOLOGY

## 2020-12-10 PROCEDURE — 92201 OPSCPY EXTND RTA DRAW UNI/BI: CPT | Performed by: OPHTHALMOLOGY

## 2020-12-10 PROCEDURE — 92014 COMPRE OPH EXAM EST PT 1/>: CPT | Performed by: OPHTHALMOLOGY

## 2020-12-10 PROCEDURE — 83861 MICROFLUID ANALY TEARS: CPT | Performed by: OPHTHALMOLOGY

## 2020-12-10 ASSESSMENT — REFRACTION_CURRENTRX
OD_CYLINDER: -1.50
OD_OVR_VA: 20/
OS_VPRISM_DIRECTION: SV
OD_VPRISM_DIRECTION: SV
OS_CYLINDER: -0.75
OS_OVR_VA: 20/
OS_SPHERE: -0.25
OD_SPHERE: PLANO
OD_AXIS: 61
OS_AXIS: 093

## 2020-12-10 ASSESSMENT — REFRACTION_MANIFEST
OD_SPHERE: PLANO
OS_SPHERE: -0.25
OD_CYLINDER: -1.50
OD_VA2: 20/25
OD_AXIS: 060
OD_ADD: +2.50
OS_CYLINDER: -0.75
OS_VA1: 20/25
OS_AXIS: 100
OS_VA2: 20/25
OS_ADD: +2.50
OU_VA: 20/25
OD_VA1: 20/25

## 2020-12-10 ASSESSMENT — REFRACTION_AUTOREFRACTION
OS_CYLINDER: -0.75
OD_CYLINDER: -1.25
OS_AXIS: 74
OD_AXIS: 54
OD_SPHERE: -0.25
OS_SPHERE: -1.00

## 2020-12-10 ASSESSMENT — SPHEQUIV_DERIVED
OD_SPHEQUIV: -0.875
OS_SPHEQUIV: -0.625
OS_SPHEQUIV: -1.375

## 2020-12-10 ASSESSMENT — VISUAL ACUITY
OS_BCVA: 20/25
OD_BCVA: 20/60-1

## 2020-12-10 ASSESSMENT — CONFRONTATIONAL VISUAL FIELD TEST (CVF)
OS_FINDINGS: FULL
OD_FINDINGS: FULL

## 2021-02-04 ENCOUNTER — DOCTOR'S OFFICE (OUTPATIENT)
Dept: URBAN - NONMETROPOLITAN AREA CLINIC 1 | Facility: CLINIC | Age: 71
Setting detail: OPHTHALMOLOGY
End: 2021-02-04
Payer: MEDICARE

## 2021-02-04 VITALS — HEIGHT: 55 IN

## 2021-02-04 DIAGNOSIS — H40.053: ICD-10-CM

## 2021-02-04 DIAGNOSIS — H35.3132: ICD-10-CM

## 2021-02-04 DIAGNOSIS — H35.372: ICD-10-CM

## 2021-02-04 DIAGNOSIS — H04.123: ICD-10-CM

## 2021-02-04 DIAGNOSIS — H43.813: ICD-10-CM

## 2021-02-04 PROCEDURE — 92014 COMPRE OPH EXAM EST PT 1/>: CPT | Performed by: OPHTHALMOLOGY

## 2021-02-04 PROCEDURE — 83861 MICROFLUID ANALY TEARS: CPT | Performed by: OPHTHALMOLOGY

## 2021-02-04 PROCEDURE — 92134 CPTRZ OPH DX IMG PST SGM RTA: CPT | Performed by: OPHTHALMOLOGY

## 2021-02-04 PROCEDURE — 92201 OPSCPY EXTND RTA DRAW UNI/BI: CPT | Performed by: OPHTHALMOLOGY

## 2021-02-04 ASSESSMENT — REFRACTION_AUTOREFRACTION
OD_AXIS: 54
OS_AXIS: 74
OS_SPHERE: -1.00
OD_CYLINDER: -1.25
OS_CYLINDER: -0.75
OD_SPHERE: -0.25

## 2021-02-04 ASSESSMENT — REFRACTION_CURRENTRX
OS_SPHERE: -0.25
OD_SPHERE: PLANO
OS_AXIS: 093
OD_OVR_VA: 20/
OS_CYLINDER: -0.75
OS_VPRISM_DIRECTION: SV
OD_CYLINDER: -1.50
OD_VPRISM_DIRECTION: SV
OD_AXIS: 61
OS_OVR_VA: 20/

## 2021-02-04 ASSESSMENT — REFRACTION_MANIFEST
OD_ADD: +2.50
OS_CYLINDER: -0.75
OS_SPHERE: -0.25
OS_VA2: 20/25
OD_CYLINDER: -1.50
OD_VA2: 20/25
OD_AXIS: 060
OD_SPHERE: PLANO
OS_VA1: 20/25
OS_AXIS: 100
OU_VA: 20/25
OS_ADD: +2.50
OD_VA1: 20/25

## 2021-02-04 ASSESSMENT — TONOMETRY
OD_IOP_MMHG: 13
OS_IOP_MMHG: 19

## 2021-02-04 ASSESSMENT — PACHYMETRY
OS_CT_UM: 577
OS_CT_CORRECTION: -2
OD_CT_UM: 558
OD_CT_CORRECTION: -1

## 2021-02-04 ASSESSMENT — SPHEQUIV_DERIVED
OS_SPHEQUIV: -0.625
OD_SPHEQUIV: -0.875
OS_SPHEQUIV: -1.375

## 2021-02-04 ASSESSMENT — VISUAL ACUITY
OD_BCVA: 20/60-2
OS_BCVA: 20/20

## 2021-02-04 ASSESSMENT — CONFRONTATIONAL VISUAL FIELD TEST (CVF)
OD_FINDINGS: FULL
OS_FINDINGS: FULL

## 2021-02-25 ENCOUNTER — DOCTOR'S OFFICE (OUTPATIENT)
Dept: URBAN - NONMETROPOLITAN AREA CLINIC 1 | Facility: CLINIC | Age: 71
Setting detail: OPHTHALMOLOGY
End: 2021-02-25
Payer: MEDICARE

## 2021-02-25 DIAGNOSIS — H35.372: ICD-10-CM

## 2021-02-25 DIAGNOSIS — H35.3132: ICD-10-CM

## 2021-02-25 DIAGNOSIS — H40.053: ICD-10-CM

## 2021-02-25 PROCEDURE — 92014 COMPRE OPH EXAM EST PT 1/>: CPT | Performed by: OPHTHALMOLOGY

## 2021-02-25 PROCEDURE — 92250 FUNDUS PHOTOGRAPHY W/I&R: CPT | Performed by: OPHTHALMOLOGY

## 2021-02-25 PROCEDURE — 92235 FLUORESCEIN ANGRPH MLTIFRAME: CPT | Performed by: OPHTHALMOLOGY

## 2021-02-25 ASSESSMENT — REFRACTION_MANIFEST
OD_VA1: 20/25
OS_SPHERE: -0.25
OD_CYLINDER: -1.50
OD_SPHERE: PLANO
OS_ADD: +2.50
OD_VA2: 20/25
OS_CYLINDER: -0.75
OD_ADD: +2.50
OU_VA: 20/25
OS_VA2: 20/25
OS_AXIS: 100
OD_AXIS: 060
OS_VA1: 20/25

## 2021-02-25 ASSESSMENT — REFRACTION_CURRENTRX
OD_CYLINDER: -1.50
OS_SPHERE: -0.25
OD_OVR_VA: 20/
OS_CYLINDER: -0.75
OD_SPHERE: PLANO
OD_VPRISM_DIRECTION: SV
OS_OVR_VA: 20/
OD_AXIS: 61
OS_VPRISM_DIRECTION: SV
OS_AXIS: 093

## 2021-02-25 ASSESSMENT — REFRACTION_AUTOREFRACTION
OD_CYLINDER: -1.25
OD_AXIS: 54
OS_CYLINDER: -0.75
OS_AXIS: 74
OD_SPHERE: -0.25
OS_SPHERE: -1.00

## 2021-02-25 ASSESSMENT — VISUAL ACUITY
OS_BCVA: 20/30-1
OD_BCVA: 20/70

## 2021-02-25 ASSESSMENT — SPHEQUIV_DERIVED
OS_SPHEQUIV: -1.375
OS_SPHEQUIV: -0.625
OD_SPHEQUIV: -0.875

## 2021-02-25 ASSESSMENT — CONFRONTATIONAL VISUAL FIELD TEST (CVF)
OD_FINDINGS: FULL
OS_FINDINGS: FULL

## 2021-03-25 ENCOUNTER — DOCTOR'S OFFICE (OUTPATIENT)
Dept: URBAN - NONMETROPOLITAN AREA CLINIC 1 | Facility: CLINIC | Age: 71
Setting detail: OPHTHALMOLOGY
End: 2021-03-25
Payer: MEDICARE

## 2021-03-25 DIAGNOSIS — H35.3132: ICD-10-CM

## 2021-03-25 DIAGNOSIS — H43.822: ICD-10-CM

## 2021-03-25 DIAGNOSIS — H43.811: ICD-10-CM

## 2021-03-25 DIAGNOSIS — H43.813: ICD-10-CM

## 2021-03-25 DIAGNOSIS — H35.372: ICD-10-CM

## 2021-03-25 DIAGNOSIS — H43.812: ICD-10-CM

## 2021-03-25 DIAGNOSIS — H35.81: ICD-10-CM

## 2021-03-25 DIAGNOSIS — H35.343: ICD-10-CM

## 2021-03-25 DIAGNOSIS — H40.053: ICD-10-CM

## 2021-03-25 PROCEDURE — 92201 OPSCPY EXTND RTA DRAW UNI/BI: CPT | Performed by: OPHTHALMOLOGY

## 2021-03-25 PROCEDURE — 92014 COMPRE OPH EXAM EST PT 1/>: CPT | Performed by: OPHTHALMOLOGY

## 2021-03-25 PROCEDURE — 92134 CPTRZ OPH DX IMG PST SGM RTA: CPT | Performed by: OPHTHALMOLOGY

## 2021-03-25 ASSESSMENT — REFRACTION_AUTOREFRACTION
OS_CYLINDER: -0.75
OD_SPHERE: -0.25
OS_SPHERE: -1.00
OS_AXIS: 74
OD_CYLINDER: -1.25
OD_AXIS: 54

## 2021-03-25 ASSESSMENT — VISUAL ACUITY
OS_BCVA: 20/25
OD_BCVA: 20/50-1

## 2021-03-25 ASSESSMENT — REFRACTION_CURRENTRX
OD_SPHERE: PLANO
OS_SPHERE: -0.25
OS_OVR_VA: 20/
OS_AXIS: 093
OD_VPRISM_DIRECTION: SV
OS_VPRISM_DIRECTION: SV
OD_AXIS: 61
OS_CYLINDER: -0.75
OD_CYLINDER: -1.50
OD_OVR_VA: 20/

## 2021-03-25 ASSESSMENT — REFRACTION_MANIFEST
OD_AXIS: 060
OU_VA: 20/25
OD_VA1: 20/25
OS_VA2: 20/25
OS_VA1: 20/25
OD_ADD: +2.50
OS_SPHERE: -0.25
OD_VA2: 20/25
OD_CYLINDER: -1.50
OS_AXIS: 100
OD_SPHERE: PLANO
OS_ADD: +2.50
OS_CYLINDER: -0.75

## 2021-03-25 ASSESSMENT — SPHEQUIV_DERIVED
OS_SPHEQUIV: -1.375
OS_SPHEQUIV: -0.625
OD_SPHEQUIV: -0.875

## 2021-03-25 ASSESSMENT — CONFRONTATIONAL VISUAL FIELD TEST (CVF)
OS_FINDINGS: FULL
OD_FINDINGS: FULL

## 2021-04-02 ENCOUNTER — DOCTOR'S OFFICE (OUTPATIENT)
Dept: URBAN - NONMETROPOLITAN AREA CLINIC 1 | Facility: CLINIC | Age: 71
Setting detail: OPHTHALMOLOGY
End: 2021-04-02
Payer: MEDICARE

## 2021-04-02 DIAGNOSIS — H35.372: ICD-10-CM

## 2021-04-02 PROCEDURE — 67515 INJECT/TREAT EYE SOCKET: CPT | Performed by: OPHTHALMOLOGY

## 2021-04-02 ASSESSMENT — REFRACTION_AUTOREFRACTION
OD_CYLINDER: -1.25
OS_CYLINDER: -0.75
OS_SPHERE: -1.00
OD_AXIS: 54
OS_AXIS: 74
OD_SPHERE: -0.25

## 2021-04-02 ASSESSMENT — VISUAL ACUITY
OS_BCVA: 20/20-2
OD_BCVA: 20/50+2

## 2021-04-02 ASSESSMENT — SPHEQUIV_DERIVED
OS_SPHEQUIV: -1.375
OD_SPHEQUIV: -0.875

## 2021-04-29 ENCOUNTER — DOCTOR'S OFFICE (OUTPATIENT)
Dept: URBAN - NONMETROPOLITAN AREA CLINIC 1 | Facility: CLINIC | Age: 71
Setting detail: OPHTHALMOLOGY
End: 2021-04-29
Payer: MEDICARE

## 2021-04-29 DIAGNOSIS — H35.3132: ICD-10-CM

## 2021-04-29 DIAGNOSIS — H04.123: ICD-10-CM

## 2021-04-29 DIAGNOSIS — H35.81: ICD-10-CM

## 2021-04-29 DIAGNOSIS — H35.343: ICD-10-CM

## 2021-04-29 DIAGNOSIS — H43.813: ICD-10-CM

## 2021-04-29 DIAGNOSIS — H35.412: ICD-10-CM

## 2021-04-29 DIAGNOSIS — H43.822: ICD-10-CM

## 2021-04-29 DIAGNOSIS — H43.811: ICD-10-CM

## 2021-04-29 DIAGNOSIS — H40.053: ICD-10-CM

## 2021-04-29 DIAGNOSIS — H35.372: ICD-10-CM

## 2021-04-29 PROCEDURE — 92201 OPSCPY EXTND RTA DRAW UNI/BI: CPT | Performed by: OPHTHALMOLOGY

## 2021-04-29 PROCEDURE — 92134 CPTRZ OPH DX IMG PST SGM RTA: CPT | Performed by: OPHTHALMOLOGY

## 2021-04-29 PROCEDURE — 92014 COMPRE OPH EXAM EST PT 1/>: CPT | Performed by: OPHTHALMOLOGY

## 2021-04-29 ASSESSMENT — SPHEQUIV_DERIVED
OD_SPHEQUIV: -0.875
OS_SPHEQUIV: -1.375

## 2021-04-29 ASSESSMENT — REFRACTION_AUTOREFRACTION
OD_SPHERE: -0.25
OD_AXIS: 54
OD_CYLINDER: -1.25
OS_SPHERE: -1.00
OS_AXIS: 74
OS_CYLINDER: -0.75

## 2021-04-29 ASSESSMENT — CONFRONTATIONAL VISUAL FIELD TEST (CVF)
OD_FINDINGS: FULL
OS_FINDINGS: FULL

## 2021-04-29 ASSESSMENT — VISUAL ACUITY
OS_BCVA: 20/20-1
OD_BCVA: 20/50+2

## 2021-05-13 ENCOUNTER — DOCTOR'S OFFICE (OUTPATIENT)
Dept: URBAN - NONMETROPOLITAN AREA CLINIC 1 | Facility: CLINIC | Age: 71
Setting detail: OPHTHALMOLOGY
End: 2021-05-13
Payer: MEDICARE

## 2021-05-13 DIAGNOSIS — H35.81: ICD-10-CM

## 2021-05-13 PROCEDURE — 67515 INJECT/TREAT EYE SOCKET: CPT | Performed by: OPHTHALMOLOGY

## 2021-05-13 ASSESSMENT — SPHEQUIV_DERIVED
OD_SPHEQUIV: -0.875
OS_SPHEQUIV: -1.375

## 2021-05-13 ASSESSMENT — REFRACTION_AUTOREFRACTION
OS_AXIS: 74
OD_AXIS: 54
OD_SPHERE: -0.25
OD_CYLINDER: -1.25
OS_CYLINDER: -0.75
OS_SPHERE: -1.00

## 2021-05-13 ASSESSMENT — VISUAL ACUITY
OS_BCVA: 20/20-1
OD_BCVA: 20/60-1

## 2021-06-03 ENCOUNTER — DOCTOR'S OFFICE (OUTPATIENT)
Dept: URBAN - NONMETROPOLITAN AREA CLINIC 1 | Facility: CLINIC | Age: 71
Setting detail: OPHTHALMOLOGY
End: 2021-06-03
Payer: MEDICARE

## 2021-06-03 VITALS — HEIGHT: 55 IN

## 2021-06-03 DIAGNOSIS — H43.813: ICD-10-CM

## 2021-06-03 DIAGNOSIS — H35.3132: ICD-10-CM

## 2021-06-03 DIAGNOSIS — H35.343: ICD-10-CM

## 2021-06-03 DIAGNOSIS — H35.372: ICD-10-CM

## 2021-06-03 DIAGNOSIS — H40.053: ICD-10-CM

## 2021-06-03 DIAGNOSIS — H43.822: ICD-10-CM

## 2021-06-03 DIAGNOSIS — H35.81: ICD-10-CM

## 2021-06-03 PROCEDURE — 92014 COMPRE OPH EXAM EST PT 1/>: CPT | Performed by: OPHTHALMOLOGY

## 2021-06-03 PROCEDURE — 92134 CPTRZ OPH DX IMG PST SGM RTA: CPT | Performed by: OPHTHALMOLOGY

## 2021-06-03 PROCEDURE — 92201 OPSCPY EXTND RTA DRAW UNI/BI: CPT | Performed by: OPHTHALMOLOGY

## 2021-06-03 ASSESSMENT — CONFRONTATIONAL VISUAL FIELD TEST (CVF)
OS_FINDINGS: FULL
OD_FINDINGS: FULL

## 2021-06-03 ASSESSMENT — VISUAL ACUITY
OS_BCVA: 20/20-2
OD_BCVA: 20/50+2

## 2021-06-03 ASSESSMENT — REFRACTION_AUTOREFRACTION
OD_CYLINDER: -3.50
OD_AXIS: 42
OD_SPHERE: +2.25
OS_SPHERE: +0.25
OS_CYLINDER: -1.00
OS_AXIS: 141

## 2021-06-03 ASSESSMENT — SPHEQUIV_DERIVED
OS_SPHEQUIV: -0.25
OD_SPHEQUIV: 0.5

## 2021-06-24 ENCOUNTER — DOCTOR'S OFFICE (OUTPATIENT)
Dept: URBAN - NONMETROPOLITAN AREA CLINIC 1 | Facility: CLINIC | Age: 71
Setting detail: OPHTHALMOLOGY
End: 2021-06-24
Payer: MEDICARE

## 2021-06-24 DIAGNOSIS — H35.372: ICD-10-CM

## 2021-06-24 DIAGNOSIS — H35.81: ICD-10-CM

## 2021-06-24 DIAGNOSIS — H43.822: ICD-10-CM

## 2021-06-24 PROCEDURE — 92014 COMPRE OPH EXAM EST PT 1/>: CPT | Performed by: OPHTHALMOLOGY

## 2021-06-24 PROCEDURE — 92235 FLUORESCEIN ANGRPH MLTIFRAME: CPT | Performed by: OPHTHALMOLOGY

## 2021-06-24 PROCEDURE — 92250 FUNDUS PHOTOGRAPHY W/I&R: CPT | Performed by: OPHTHALMOLOGY

## 2021-06-24 ASSESSMENT — REFRACTION_AUTOREFRACTION
OD_AXIS: 42
OS_SPHERE: +0.25
OD_SPHERE: +2.25
OD_CYLINDER: -3.50
OS_AXIS: 141
OS_CYLINDER: -1.00

## 2021-06-24 ASSESSMENT — CONFRONTATIONAL VISUAL FIELD TEST (CVF)
OD_FINDINGS: FULL
OS_FINDINGS: FULL

## 2021-06-24 ASSESSMENT — SPHEQUIV_DERIVED
OD_SPHEQUIV: 0.5
OS_SPHEQUIV: -0.25

## 2021-06-24 ASSESSMENT — VISUAL ACUITY
OS_BCVA: 20/20-1
OD_BCVA: 20/60+2

## 2021-07-13 ENCOUNTER — DOCTOR'S OFFICE (OUTPATIENT)
Dept: URBAN - NONMETROPOLITAN AREA CLINIC 1 | Facility: CLINIC | Age: 71
Setting detail: OPHTHALMOLOGY
End: 2021-07-13
Payer: COMMERCIAL

## 2021-07-13 DIAGNOSIS — H52.4: ICD-10-CM

## 2021-07-13 DIAGNOSIS — H52.223: ICD-10-CM

## 2021-07-13 PROCEDURE — 92012 INTRM OPH EXAM EST PATIENT: CPT | Performed by: OPTOMETRIST

## 2021-07-13 PROCEDURE — 92015 DETERMINE REFRACTIVE STATE: CPT | Performed by: OPTOMETRIST

## 2021-07-13 ASSESSMENT — REFRACTION_MANIFEST
OS_CYLINDER: -0.50
OD_VA2: 20/20
OD_AXIS: 060
OD_ADD: +2.50
OS_ADD: +2.50
OD_VA1: 20/20
OS_AXIS: 030
OD_CYLINDER: -1.25
OS_VA2: 20/25-2
OS_SPHERE: -1.25
OD_SPHERE: PLANO
OS_VA1: 20/25-2

## 2021-07-13 ASSESSMENT — REFRACTION_CURRENTRX
OS_AXIS: 100
OS_CYLINDER: -0.75
OD_VPRISM_DIRECTION: PROGS
OS_OVR_VA: 20/
OD_AXIS: 060
OD_ADD: +2.50
OD_OVR_VA: 20/
OS_SPHERE: -0.25
OS_VPRISM_DIRECTION: PROGS
OS_ADD: +2.50
OD_SPHERE: PLANO
OD_CYLINDER: -1.25

## 2021-07-13 ASSESSMENT — REFRACTION_AUTOREFRACTION
OS_AXIS: 30
OD_SPHERE: 0.00
OD_AXIS: 70
OS_SPHERE: -1.50
OS_CYLINDER: -0.50
OD_CYLINDER: -1.50

## 2021-07-13 ASSESSMENT — SPHEQUIV_DERIVED
OS_SPHEQUIV: -1.75
OD_SPHEQUIV: -0.75
OS_SPHEQUIV: -1.5

## 2021-07-13 ASSESSMENT — VISUAL ACUITY
OD_BCVA: 20/50-2
OS_BCVA: 20/20

## 2021-07-26 ENCOUNTER — DOCTOR'S OFFICE (OUTPATIENT)
Dept: URBAN - NONMETROPOLITAN AREA CLINIC 1 | Facility: CLINIC | Age: 71
Setting detail: OPHTHALMOLOGY
End: 2021-07-26
Payer: MEDICARE

## 2021-07-26 VITALS — HEIGHT: 55 IN

## 2021-07-26 DIAGNOSIS — H35.372: ICD-10-CM

## 2021-07-26 DIAGNOSIS — H35.81: ICD-10-CM

## 2021-07-26 DIAGNOSIS — H43.822: ICD-10-CM

## 2021-07-26 PROCEDURE — 92014 COMPRE OPH EXAM EST PT 1/>: CPT | Performed by: OPHTHALMOLOGY

## 2021-07-26 PROCEDURE — 92134 CPTRZ OPH DX IMG PST SGM RTA: CPT | Performed by: OPHTHALMOLOGY

## 2021-07-26 ASSESSMENT — REFRACTION_MANIFEST
OD_SPHERE: PLANO
OS_CYLINDER: -0.50
OS_AXIS: 030
OS_ADD: +2.50
OD_VA1: 20/20
OS_SPHERE: -1.25
OD_ADD: +2.50
OD_VA2: 20/20
OD_CYLINDER: -1.25
OD_AXIS: 060
OS_VA2: 20/25-2
OS_VA1: 20/25-2

## 2021-07-26 ASSESSMENT — REFRACTION_CURRENTRX
OS_VPRISM_DIRECTION: PROGS
OS_OVR_VA: 20/
OS_AXIS: 100
OD_AXIS: 060
OD_VPRISM_DIRECTION: PROGS
OD_OVR_VA: 20/
OD_CYLINDER: -1.25
OS_ADD: +2.50
OD_ADD: +2.50
OS_SPHERE: -0.25
OD_SPHERE: PLANO
OS_CYLINDER: -0.75

## 2021-07-26 ASSESSMENT — REFRACTION_AUTOREFRACTION
OD_SPHERE: 0.00
OS_SPHERE: -1.50
OD_AXIS: 70
OS_AXIS: 30
OD_CYLINDER: -1.50
OS_CYLINDER: -0.50

## 2021-07-26 ASSESSMENT — VISUAL ACUITY
OD_BCVA: 20/30-2
OS_BCVA: 20/20

## 2021-07-26 ASSESSMENT — CONFRONTATIONAL VISUAL FIELD TEST (CVF)
OD_FINDINGS: FULL
OS_FINDINGS: FULL

## 2021-07-26 ASSESSMENT — SPHEQUIV_DERIVED
OD_SPHEQUIV: -0.75
OS_SPHEQUIV: -1.75
OS_SPHEQUIV: -1.5

## 2021-08-02 ENCOUNTER — DOCTOR'S OFFICE (OUTPATIENT)
Dept: URBAN - NONMETROPOLITAN AREA CLINIC 1 | Facility: CLINIC | Age: 71
Setting detail: OPHTHALMOLOGY
End: 2021-08-02
Payer: MEDICARE

## 2021-08-02 DIAGNOSIS — H35.81: ICD-10-CM

## 2021-08-02 PROCEDURE — 67515 INJECT/TREAT EYE SOCKET: CPT | Performed by: OPHTHALMOLOGY

## 2021-08-02 ASSESSMENT — REFRACTION_AUTOREFRACTION
OD_AXIS: 70
OD_SPHERE: 0.00
OD_CYLINDER: -1.50
OS_AXIS: 30
OS_SPHERE: -1.50
OS_CYLINDER: -0.50

## 2021-08-02 ASSESSMENT — SPHEQUIV_DERIVED
OD_SPHEQUIV: -0.75
OS_SPHEQUIV: -1.75

## 2021-08-02 ASSESSMENT — VISUAL ACUITY
OS_BCVA: 20/20
OD_BCVA: 20/25-2

## 2021-08-26 ENCOUNTER — DOCTOR'S OFFICE (OUTPATIENT)
Dept: URBAN - NONMETROPOLITAN AREA CLINIC 1 | Facility: CLINIC | Age: 71
Setting detail: OPHTHALMOLOGY
End: 2021-08-26
Payer: MEDICARE

## 2021-08-26 DIAGNOSIS — H35.372: ICD-10-CM

## 2021-08-26 DIAGNOSIS — H35.81: ICD-10-CM

## 2021-08-26 DIAGNOSIS — H35.3132: ICD-10-CM

## 2021-08-26 DIAGNOSIS — H43.822: ICD-10-CM

## 2021-08-26 DIAGNOSIS — H40.053: ICD-10-CM

## 2021-08-26 PROCEDURE — 92014 COMPRE OPH EXAM EST PT 1/>: CPT | Performed by: OPHTHALMOLOGY

## 2021-08-26 PROCEDURE — 92134 CPTRZ OPH DX IMG PST SGM RTA: CPT | Performed by: OPHTHALMOLOGY

## 2021-08-26 PROCEDURE — 92202 OPSCPY EXTND ON/MAC DRAW: CPT | Performed by: OPHTHALMOLOGY

## 2021-08-26 ASSESSMENT — REFRACTION_AUTOREFRACTION
OD_AXIS: 70
OS_CYLINDER: -0.50
OD_SPHERE: 0.00
OS_SPHERE: -1.50
OD_CYLINDER: -1.50
OS_AXIS: 30

## 2021-08-26 ASSESSMENT — CONFRONTATIONAL VISUAL FIELD TEST (CVF)
OD_FINDINGS: FULL
OS_FINDINGS: FULL

## 2021-08-26 ASSESSMENT — SPHEQUIV_DERIVED
OD_SPHEQUIV: -0.75
OS_SPHEQUIV: -1.75

## 2021-08-26 ASSESSMENT — VISUAL ACUITY
OS_BCVA: 20/20-2
OD_BCVA: 20/30-1

## 2021-10-01 ENCOUNTER — NURSE TRIAGE (OUTPATIENT)
Dept: OTHER | Facility: OTHER | Age: 71
End: 2021-10-01

## 2021-10-07 ENCOUNTER — DOCTOR'S OFFICE (OUTPATIENT)
Dept: URBAN - NONMETROPOLITAN AREA CLINIC 1 | Facility: CLINIC | Age: 71
Setting detail: OPHTHALMOLOGY
End: 2021-10-07
Payer: MEDICARE

## 2021-10-07 DIAGNOSIS — H35.81: ICD-10-CM

## 2021-10-07 DIAGNOSIS — H35.3132: ICD-10-CM

## 2021-10-07 DIAGNOSIS — H35.372: ICD-10-CM

## 2021-10-07 DIAGNOSIS — H04.123: ICD-10-CM

## 2021-10-07 DIAGNOSIS — H40.053: ICD-10-CM

## 2021-10-07 DIAGNOSIS — H43.822: ICD-10-CM

## 2021-10-07 PROCEDURE — 83861 MICROFLUID ANALY TEARS: CPT | Performed by: OPHTHALMOLOGY

## 2021-10-07 PROCEDURE — 92134 CPTRZ OPH DX IMG PST SGM RTA: CPT | Performed by: OPHTHALMOLOGY

## 2021-10-07 PROCEDURE — 92202 OPSCPY EXTND ON/MAC DRAW: CPT | Performed by: OPHTHALMOLOGY

## 2021-10-07 PROCEDURE — 92014 COMPRE OPH EXAM EST PT 1/>: CPT | Performed by: OPHTHALMOLOGY

## 2021-10-07 ASSESSMENT — REFRACTION_AUTOREFRACTION
OD_AXIS: 70
OD_SPHERE: 0.00
OD_CYLINDER: -1.50
OS_AXIS: 30
OS_CYLINDER: -0.50
OS_SPHERE: -1.50

## 2021-10-07 ASSESSMENT — SPHEQUIV_DERIVED
OS_SPHEQUIV: -1.75
OD_SPHEQUIV: -0.75

## 2021-10-07 ASSESSMENT — VISUAL ACUITY
OD_BCVA: 20/25-2
OS_BCVA: 20/20

## 2021-10-07 ASSESSMENT — CONFRONTATIONAL VISUAL FIELD TEST (CVF)
OD_FINDINGS: FULL
OS_FINDINGS: FULL

## 2021-10-15 ENCOUNTER — DOCTOR'S OFFICE (OUTPATIENT)
Dept: URBAN - NONMETROPOLITAN AREA CLINIC 1 | Facility: CLINIC | Age: 71
Setting detail: OPHTHALMOLOGY
End: 2021-10-15
Payer: MEDICARE

## 2021-10-15 ENCOUNTER — RX ONLY (RX ONLY)
Age: 71
End: 2021-10-15

## 2021-10-15 DIAGNOSIS — H35.81: ICD-10-CM

## 2021-10-15 PROCEDURE — 67515 INJECT/TREAT EYE SOCKET: CPT | Performed by: OPHTHALMOLOGY

## 2021-10-15 ASSESSMENT — SPHEQUIV_DERIVED
OS_SPHEQUIV: -1.75
OD_SPHEQUIV: -0.75

## 2021-10-15 ASSESSMENT — VISUAL ACUITY
OS_BCVA: 20/20
OD_BCVA: 20/20-2

## 2021-10-15 ASSESSMENT — REFRACTION_AUTOREFRACTION
OD_SPHERE: 0.00
OS_SPHERE: -1.50
OS_AXIS: 30
OD_CYLINDER: -1.50
OD_AXIS: 70
OS_CYLINDER: -0.50

## 2021-11-29 ENCOUNTER — DOCTOR'S OFFICE (OUTPATIENT)
Dept: URBAN - NONMETROPOLITAN AREA CLINIC 1 | Facility: CLINIC | Age: 71
Setting detail: OPHTHALMOLOGY
End: 2021-11-29
Payer: MEDICARE

## 2021-11-29 DIAGNOSIS — H35.81: ICD-10-CM

## 2021-11-29 DIAGNOSIS — H35.372: ICD-10-CM

## 2021-11-29 DIAGNOSIS — H43.822: ICD-10-CM

## 2021-11-29 DIAGNOSIS — H35.3132: ICD-10-CM

## 2021-11-29 PROCEDURE — 99214 OFFICE O/P EST MOD 30 MIN: CPT | Performed by: OPHTHALMOLOGY

## 2021-11-29 PROCEDURE — 92134 CPTRZ OPH DX IMG PST SGM RTA: CPT | Performed by: OPHTHALMOLOGY

## 2021-11-29 ASSESSMENT — VISUAL ACUITY
OS_BCVA: 20/20-1
OD_BCVA: 20/25+1

## 2021-11-29 ASSESSMENT — SPHEQUIV_DERIVED
OS_SPHEQUIV: -1.75
OD_SPHEQUIV: -0.75

## 2021-11-29 ASSESSMENT — REFRACTION_AUTOREFRACTION
OD_AXIS: 70
OD_SPHERE: 0.00
OS_SPHERE: -1.50
OS_AXIS: 30
OD_CYLINDER: -1.50
OS_CYLINDER: -0.50

## 2021-11-29 ASSESSMENT — CONFRONTATIONAL VISUAL FIELD TEST (CVF)
OS_FINDINGS: FULL
OD_FINDINGS: FULL

## 2021-12-03 ENCOUNTER — DOCTOR'S OFFICE (OUTPATIENT)
Dept: URBAN - NONMETROPOLITAN AREA CLINIC 1 | Facility: CLINIC | Age: 71
Setting detail: OPHTHALMOLOGY
End: 2021-12-03
Payer: MEDICARE

## 2021-12-03 DIAGNOSIS — H35.3132: ICD-10-CM

## 2021-12-03 DIAGNOSIS — H35.412: ICD-10-CM

## 2021-12-03 DIAGNOSIS — H35.372: ICD-10-CM

## 2021-12-03 DIAGNOSIS — H35.81: ICD-10-CM

## 2021-12-03 DIAGNOSIS — H43.813: ICD-10-CM

## 2021-12-03 DIAGNOSIS — H04.123: ICD-10-CM

## 2021-12-03 PROCEDURE — 99214 OFFICE O/P EST MOD 30 MIN: CPT | Performed by: OPHTHALMOLOGY

## 2021-12-03 PROCEDURE — 92235 FLUORESCEIN ANGRPH MLTIFRAME: CPT | Performed by: OPHTHALMOLOGY

## 2021-12-03 PROCEDURE — 92250 FUNDUS PHOTOGRAPHY W/I&R: CPT | Performed by: OPHTHALMOLOGY

## 2021-12-03 ASSESSMENT — VISUAL ACUITY
OS_BCVA: 20/20-1
OD_BCVA: 20/25+1

## 2021-12-03 ASSESSMENT — REFRACTION_AUTOREFRACTION
OS_SPHERE: -1.50
OD_CYLINDER: -1.50
OD_SPHERE: 0.00
OS_AXIS: 30
OS_CYLINDER: -0.50
OD_AXIS: 70

## 2021-12-03 ASSESSMENT — CONFRONTATIONAL VISUAL FIELD TEST (CVF)
OD_FINDINGS: FULL
OS_FINDINGS: FULL

## 2021-12-03 ASSESSMENT — SPHEQUIV_DERIVED
OS_SPHEQUIV: -1.75
OD_SPHEQUIV: -0.75

## 2021-12-20 ENCOUNTER — DOCTOR'S OFFICE (OUTPATIENT)
Dept: URBAN - NONMETROPOLITAN AREA CLINIC 1 | Facility: CLINIC | Age: 71
Setting detail: OPHTHALMOLOGY
End: 2021-12-20
Payer: MEDICARE

## 2021-12-20 DIAGNOSIS — H35.81: ICD-10-CM

## 2021-12-20 PROCEDURE — 67515 INJECT/TREAT EYE SOCKET: CPT | Performed by: OPHTHALMOLOGY

## 2021-12-20 ASSESSMENT — VISUAL ACUITY
OS_BCVA: 20/20-1
OD_BCVA: 20/20

## 2021-12-20 ASSESSMENT — REFRACTION_AUTOREFRACTION
OD_AXIS: 70
OD_SPHERE: 0.00
OD_CYLINDER: -1.50
OS_CYLINDER: -0.50
OS_SPHERE: -1.50
OS_AXIS: 30

## 2021-12-20 ASSESSMENT — SPHEQUIV_DERIVED
OS_SPHEQUIV: -1.75
OD_SPHEQUIV: -0.75

## 2022-01-21 ENCOUNTER — DOCTOR'S OFFICE (OUTPATIENT)
Dept: URBAN - NONMETROPOLITAN AREA CLINIC 1 | Facility: CLINIC | Age: 72
Setting detail: OPHTHALMOLOGY
End: 2022-01-21
Payer: MEDICARE

## 2022-01-21 VITALS — HEIGHT: 55 IN

## 2022-01-21 DIAGNOSIS — H43.813: ICD-10-CM

## 2022-01-21 DIAGNOSIS — H35.3132: ICD-10-CM

## 2022-01-21 DIAGNOSIS — H35.372: ICD-10-CM

## 2022-01-21 DIAGNOSIS — H35.81: ICD-10-CM

## 2022-01-21 PROCEDURE — 99214 OFFICE O/P EST MOD 30 MIN: CPT | Performed by: OPHTHALMOLOGY

## 2022-01-21 PROCEDURE — 92134 CPTRZ OPH DX IMG PST SGM RTA: CPT | Performed by: OPHTHALMOLOGY

## 2022-01-21 ASSESSMENT — REFRACTION_AUTOREFRACTION
OS_AXIS: 30
OD_AXIS: 70
OS_CYLINDER: -0.50
OS_SPHERE: -1.50
OD_CYLINDER: -1.50
OD_SPHERE: 0.00

## 2022-01-21 ASSESSMENT — SPHEQUIV_DERIVED
OS_SPHEQUIV: -1.75
OD_SPHEQUIV: -0.75

## 2022-01-21 ASSESSMENT — CONFRONTATIONAL VISUAL FIELD TEST (CVF)
OS_FINDINGS: FULL
OD_FINDINGS: FULL

## 2022-01-21 ASSESSMENT — VISUAL ACUITY
OS_BCVA: 20/20
OD_BCVA: 20/25+2

## 2022-02-17 ENCOUNTER — DOCTOR'S OFFICE (OUTPATIENT)
Dept: URBAN - NONMETROPOLITAN AREA CLINIC 1 | Facility: CLINIC | Age: 72
Setting detail: OPHTHALMOLOGY
End: 2022-02-17
Payer: MEDICARE

## 2022-02-17 DIAGNOSIS — H35.81: ICD-10-CM

## 2022-02-17 PROCEDURE — 67515 INJECT/TREAT EYE SOCKET: CPT | Performed by: OPHTHALMOLOGY

## 2022-02-17 ASSESSMENT — REFRACTION_AUTOREFRACTION
OS_CYLINDER: -0.50
OS_SPHERE: -1.50
OD_CYLINDER: -1.50
OS_AXIS: 30
OD_SPHERE: 0.00
OD_AXIS: 70

## 2022-02-17 ASSESSMENT — SPHEQUIV_DERIVED
OD_SPHEQUIV: -0.75
OS_SPHEQUIV: -1.75

## 2022-02-17 ASSESSMENT — VISUAL ACUITY
OS_BCVA: 20/20
OD_BCVA: 20/25+2

## 2022-02-24 ENCOUNTER — DOCTOR'S OFFICE (OUTPATIENT)
Dept: URBAN - NONMETROPOLITAN AREA CLINIC 1 | Facility: CLINIC | Age: 72
Setting detail: OPHTHALMOLOGY
End: 2022-02-24
Payer: MEDICARE

## 2022-02-24 DIAGNOSIS — H35.412: ICD-10-CM

## 2022-02-24 DIAGNOSIS — H35.3132: ICD-10-CM

## 2022-02-24 DIAGNOSIS — H43.822: ICD-10-CM

## 2022-02-24 DIAGNOSIS — H35.81: ICD-10-CM

## 2022-02-24 DIAGNOSIS — H35.343: ICD-10-CM

## 2022-02-24 DIAGNOSIS — H04.123: ICD-10-CM

## 2022-02-24 DIAGNOSIS — H40.053: ICD-10-CM

## 2022-02-24 DIAGNOSIS — H35.372: ICD-10-CM

## 2022-02-24 DIAGNOSIS — H43.813: ICD-10-CM

## 2022-02-24 PROCEDURE — 99214 OFFICE O/P EST MOD 30 MIN: CPT | Performed by: OPHTHALMOLOGY

## 2022-02-24 PROCEDURE — 92201 OPSCPY EXTND RTA DRAW UNI/BI: CPT | Performed by: OPHTHALMOLOGY

## 2022-02-24 PROCEDURE — 92134 CPTRZ OPH DX IMG PST SGM RTA: CPT | Performed by: OPHTHALMOLOGY

## 2022-02-24 ASSESSMENT — VISUAL ACUITY
OS_BCVA: 20/20-1
OD_BCVA: 20/25-1

## 2022-02-24 ASSESSMENT — REFRACTION_AUTOREFRACTION
OD_AXIS: 70
OS_AXIS: 30
OS_SPHERE: -1.50
OD_CYLINDER: -1.50
OD_SPHERE: 0.00
OS_CYLINDER: -0.50

## 2022-02-24 ASSESSMENT — SPHEQUIV_DERIVED
OS_SPHEQUIV: -1.75
OD_SPHEQUIV: -0.75

## 2022-02-24 ASSESSMENT — CONFRONTATIONAL VISUAL FIELD TEST (CVF)
OD_FINDINGS: FULL
OS_FINDINGS: FULL

## 2022-04-07 ENCOUNTER — DOCTOR'S OFFICE (OUTPATIENT)
Dept: URBAN - NONMETROPOLITAN AREA CLINIC 1 | Facility: CLINIC | Age: 72
Setting detail: OPHTHALMOLOGY
End: 2022-04-07
Payer: MEDICARE

## 2022-04-07 DIAGNOSIS — H35.3132: ICD-10-CM

## 2022-04-07 DIAGNOSIS — H43.822: ICD-10-CM

## 2022-04-07 DIAGNOSIS — H35.81: ICD-10-CM

## 2022-04-07 DIAGNOSIS — H35.343: ICD-10-CM

## 2022-04-07 DIAGNOSIS — H04.123: ICD-10-CM

## 2022-04-07 PROCEDURE — 83861 MICROFLUID ANALY TEARS: CPT | Performed by: OPHTHALMOLOGY

## 2022-04-07 PROCEDURE — 92134 CPTRZ OPH DX IMG PST SGM RTA: CPT | Performed by: OPHTHALMOLOGY

## 2022-04-07 PROCEDURE — 99213 OFFICE O/P EST LOW 20 MIN: CPT | Performed by: OPHTHALMOLOGY

## 2022-04-07 ASSESSMENT — REFRACTION_AUTOREFRACTION
OS_CYLINDER: -0.50
OS_AXIS: 30
OD_CYLINDER: -1.50
OD_AXIS: 70
OS_SPHERE: -1.50
OD_SPHERE: 0.00

## 2022-04-07 ASSESSMENT — CONFRONTATIONAL VISUAL FIELD TEST (CVF)
OS_FINDINGS: FULL
OD_FINDINGS: FULL

## 2022-04-07 ASSESSMENT — VISUAL ACUITY
OS_BCVA: 20/20
OD_BCVA: 20/20-1

## 2022-04-07 ASSESSMENT — SPHEQUIV_DERIVED
OD_SPHEQUIV: -0.75
OS_SPHEQUIV: -1.75

## 2022-05-19 ENCOUNTER — DOCTOR'S OFFICE (OUTPATIENT)
Dept: URBAN - NONMETROPOLITAN AREA CLINIC 1 | Facility: CLINIC | Age: 72
Setting detail: OPHTHALMOLOGY
End: 2022-05-19
Payer: MEDICARE

## 2022-05-19 DIAGNOSIS — H35.343: ICD-10-CM

## 2022-05-19 DIAGNOSIS — H35.81: ICD-10-CM

## 2022-05-19 DIAGNOSIS — H35.3132: ICD-10-CM

## 2022-05-19 DIAGNOSIS — H43.822: ICD-10-CM

## 2022-05-19 PROCEDURE — 99213 OFFICE O/P EST LOW 20 MIN: CPT | Performed by: OPHTHALMOLOGY

## 2022-05-19 PROCEDURE — 92235 FLUORESCEIN ANGRPH MLTIFRAME: CPT | Performed by: OPHTHALMOLOGY

## 2022-05-19 PROCEDURE — 92250 FUNDUS PHOTOGRAPHY W/I&R: CPT | Performed by: OPHTHALMOLOGY

## 2022-05-19 ASSESSMENT — REFRACTION_AUTOREFRACTION
OS_SPHERE: -1.50
OS_CYLINDER: -0.50
OD_CYLINDER: -1.50
OD_SPHERE: 0.00
OS_AXIS: 30
OD_AXIS: 70

## 2022-05-19 ASSESSMENT — SPHEQUIV_DERIVED
OD_SPHEQUIV: -0.75
OS_SPHEQUIV: -1.75

## 2022-05-19 ASSESSMENT — CONFRONTATIONAL VISUAL FIELD TEST (CVF)
OS_FINDINGS: FULL
OD_FINDINGS: FULL

## 2022-05-19 ASSESSMENT — VISUAL ACUITY
OD_BCVA: 20/20-1
OS_BCVA: 20/20

## 2022-06-09 ENCOUNTER — DOCTOR'S OFFICE (OUTPATIENT)
Dept: URBAN - NONMETROPOLITAN AREA CLINIC 1 | Facility: CLINIC | Age: 72
Setting detail: OPHTHALMOLOGY
End: 2022-06-09
Payer: MEDICARE

## 2022-06-09 DIAGNOSIS — H35.3132: ICD-10-CM

## 2022-06-09 DIAGNOSIS — H35.372: ICD-10-CM

## 2022-06-09 DIAGNOSIS — H43.822: ICD-10-CM

## 2022-06-09 DIAGNOSIS — H35.343: ICD-10-CM

## 2022-06-09 DIAGNOSIS — H35.81: ICD-10-CM

## 2022-06-09 DIAGNOSIS — H04.123: ICD-10-CM

## 2022-06-09 PROCEDURE — 92134 CPTRZ OPH DX IMG PST SGM RTA: CPT | Performed by: OPHTHALMOLOGY

## 2022-06-09 PROCEDURE — 92202 OPSCPY EXTND ON/MAC DRAW: CPT | Performed by: OPHTHALMOLOGY

## 2022-06-09 PROCEDURE — 83861 MICROFLUID ANALY TEARS: CPT | Performed by: OPHTHALMOLOGY

## 2022-06-09 PROCEDURE — 99213 OFFICE O/P EST LOW 20 MIN: CPT | Performed by: OPHTHALMOLOGY

## 2022-06-09 ASSESSMENT — REFRACTION_AUTOREFRACTION
OS_CYLINDER: -0.50
OD_AXIS: 70
OS_AXIS: 30
OD_SPHERE: 0.00
OD_CYLINDER: -1.50
OS_SPHERE: -1.50

## 2022-06-09 ASSESSMENT — SPHEQUIV_DERIVED
OS_SPHEQUIV: -1.75
OD_SPHEQUIV: -0.75

## 2022-06-09 ASSESSMENT — CONFRONTATIONAL VISUAL FIELD TEST (CVF)
OD_FINDINGS: FULL
OS_FINDINGS: FULL

## 2022-06-09 ASSESSMENT — VISUAL ACUITY
OS_BCVA: 20/20
OD_BCVA: 20/20-2

## 2022-07-28 ENCOUNTER — DOCTOR'S OFFICE (OUTPATIENT)
Dept: URBAN - NONMETROPOLITAN AREA CLINIC 1 | Facility: CLINIC | Age: 72
Setting detail: OPHTHALMOLOGY
End: 2022-07-28
Payer: MEDICARE

## 2022-07-28 DIAGNOSIS — H35.3132: ICD-10-CM

## 2022-07-28 DIAGNOSIS — H35.343: ICD-10-CM

## 2022-07-28 DIAGNOSIS — H04.123: ICD-10-CM

## 2022-07-28 DIAGNOSIS — H35.372: ICD-10-CM

## 2022-07-28 DIAGNOSIS — H35.81: ICD-10-CM

## 2022-07-28 PROCEDURE — 83861 MICROFLUID ANALY TEARS: CPT | Performed by: OPHTHALMOLOGY

## 2022-07-28 PROCEDURE — 92134 CPTRZ OPH DX IMG PST SGM RTA: CPT | Performed by: OPHTHALMOLOGY

## 2022-07-28 PROCEDURE — 99213 OFFICE O/P EST LOW 20 MIN: CPT | Performed by: OPHTHALMOLOGY

## 2022-07-28 ASSESSMENT — REFRACTION_AUTOREFRACTION
OD_CYLINDER: -1.50
OS_CYLINDER: -0.50
OD_AXIS: 70
OS_SPHERE: -1.50
OS_AXIS: 30
OD_SPHERE: 0.00

## 2022-07-28 ASSESSMENT — CONFRONTATIONAL VISUAL FIELD TEST (CVF)
OD_FINDINGS: FULL
OS_FINDINGS: FULL

## 2022-07-28 ASSESSMENT — SPHEQUIV_DERIVED
OS_SPHEQUIV: -1.75
OD_SPHEQUIV: -0.75

## 2022-07-28 ASSESSMENT — VISUAL ACUITY
OD_BCVA: 20/20
OS_BCVA: 20/20

## 2022-10-10 ENCOUNTER — DOCTOR'S OFFICE (OUTPATIENT)
Dept: URBAN - NONMETROPOLITAN AREA CLINIC 1 | Facility: CLINIC | Age: 72
Setting detail: OPHTHALMOLOGY
End: 2022-10-10
Payer: MEDICARE

## 2022-10-10 DIAGNOSIS — H35.3132: ICD-10-CM

## 2022-10-10 DIAGNOSIS — H35.81: ICD-10-CM

## 2022-10-10 DIAGNOSIS — H35.372: ICD-10-CM

## 2022-10-10 DIAGNOSIS — H04.123: ICD-10-CM

## 2022-10-10 DIAGNOSIS — H35.343: ICD-10-CM

## 2022-10-10 PROBLEM — H43.822 VITREOMACULAR ADHESION ; LEFT EYE: Status: ACTIVE | Noted: 2020-09-17

## 2022-10-10 PROBLEM — H52.223 ASTIGMATISM, REGULAR; BOTH EYES: Status: ACTIVE | Noted: 2017-03-13

## 2022-10-10 PROBLEM — H43.811 POST VITREOUS DETACHMENT; RIGHT EYE, LEFT EYE: Status: ACTIVE | Noted: 2017-03-13

## 2022-10-10 PROBLEM — H35.412 LATTICE DEGENERATION; LEFT EYE: Status: ACTIVE | Noted: 2017-03-13

## 2022-10-10 PROBLEM — H40.053: Status: ACTIVE | Noted: 2017-03-13

## 2022-10-10 PROBLEM — H43.812 POST VITREOUS DETACHMENT; RIGHT EYE, LEFT EYE: Status: ACTIVE | Noted: 2017-03-13

## 2022-10-10 PROCEDURE — 83861 MICROFLUID ANALY TEARS: CPT | Performed by: OPHTHALMOLOGY

## 2022-10-10 PROCEDURE — 92134 CPTRZ OPH DX IMG PST SGM RTA: CPT | Performed by: OPHTHALMOLOGY

## 2022-10-10 PROCEDURE — 99213 OFFICE O/P EST LOW 20 MIN: CPT | Performed by: OPHTHALMOLOGY

## 2022-10-10 ASSESSMENT — SPHEQUIV_DERIVED
OS_SPHEQUIV: -1.75
OD_SPHEQUIV: -0.75

## 2022-10-10 ASSESSMENT — REFRACTION_AUTOREFRACTION
OD_CYLINDER: -1.50
OD_AXIS: 70
OS_SPHERE: -1.50
OS_CYLINDER: -0.50
OS_AXIS: 30
OD_SPHERE: 0.00

## 2022-10-10 ASSESSMENT — CONFRONTATIONAL VISUAL FIELD TEST (CVF)
OD_FINDINGS: FULL
OS_FINDINGS: FULL

## 2022-10-10 ASSESSMENT — VISUAL ACUITY
OS_BCVA: 20/20-1
OD_BCVA: 20/25

## 2022-11-14 ENCOUNTER — DOCTOR'S OFFICE (OUTPATIENT)
Dept: URBAN - NONMETROPOLITAN AREA CLINIC 1 | Facility: CLINIC | Age: 72
Setting detail: OPHTHALMOLOGY
End: 2022-11-14
Payer: MEDICARE

## 2022-11-14 ENCOUNTER — RX ONLY (RX ONLY)
Age: 72
End: 2022-11-14

## 2022-11-14 DIAGNOSIS — H35.3132: ICD-10-CM

## 2022-11-14 DIAGNOSIS — H35.81: ICD-10-CM

## 2022-11-14 DIAGNOSIS — H35.343: ICD-10-CM

## 2022-11-14 DIAGNOSIS — H35.372: ICD-10-CM

## 2022-11-14 DIAGNOSIS — H43.811: ICD-10-CM

## 2022-11-14 PROCEDURE — 92014 COMPRE OPH EXAM EST PT 1/>: CPT | Performed by: OPHTHALMOLOGY

## 2022-11-14 PROCEDURE — 92235 FLUORESCEIN ANGRPH MLTIFRAME: CPT | Performed by: OPHTHALMOLOGY

## 2022-11-14 PROCEDURE — 92250 FUNDUS PHOTOGRAPHY W/I&R: CPT | Performed by: OPHTHALMOLOGY

## 2022-11-14 ASSESSMENT — REFRACTION_AUTOREFRACTION
OS_AXIS: 30
OS_CYLINDER: -0.50
OD_AXIS: 70
OD_CYLINDER: -1.50
OS_SPHERE: -1.50
OD_SPHERE: 0.00

## 2022-11-14 ASSESSMENT — SPHEQUIV_DERIVED
OD_SPHEQUIV: -0.75
OS_SPHEQUIV: -1.75

## 2022-11-14 ASSESSMENT — CONFRONTATIONAL VISUAL FIELD TEST (CVF)
OS_FINDINGS: FULL
OD_FINDINGS: FULL

## 2022-11-14 ASSESSMENT — VISUAL ACUITY
OS_BCVA: 20/20-2
OD_BCVA: 20/25

## 2022-12-12 ENCOUNTER — DOCTOR'S OFFICE (OUTPATIENT)
Dept: URBAN - NONMETROPOLITAN AREA CLINIC 1 | Facility: CLINIC | Age: 72
Setting detail: OPHTHALMOLOGY
End: 2022-12-12
Payer: MEDICARE

## 2022-12-12 DIAGNOSIS — H35.3132: ICD-10-CM

## 2022-12-12 DIAGNOSIS — H35.81: ICD-10-CM

## 2022-12-12 DIAGNOSIS — H40.053: ICD-10-CM

## 2022-12-12 DIAGNOSIS — H35.372: ICD-10-CM

## 2022-12-12 DIAGNOSIS — H35.343: ICD-10-CM

## 2022-12-12 DIAGNOSIS — H43.822: ICD-10-CM

## 2022-12-12 PROCEDURE — 92134 CPTRZ OPH DX IMG PST SGM RTA: CPT | Performed by: OPHTHALMOLOGY

## 2022-12-12 PROCEDURE — 99213 OFFICE O/P EST LOW 20 MIN: CPT | Performed by: OPHTHALMOLOGY

## 2022-12-12 ASSESSMENT — REFRACTION_AUTOREFRACTION
OS_AXIS: 30
OD_CYLINDER: -1.50
OD_AXIS: 70
OS_SPHERE: -1.50
OS_CYLINDER: -0.50
OD_SPHERE: 0.00

## 2022-12-12 ASSESSMENT — CONFRONTATIONAL VISUAL FIELD TEST (CVF)
OD_FINDINGS: FULL
OS_FINDINGS: FULL

## 2022-12-12 ASSESSMENT — VISUAL ACUITY
OD_BCVA: 20/25-1
OS_BCVA: 20/20-1

## 2022-12-12 ASSESSMENT — SPHEQUIV_DERIVED
OS_SPHEQUIV: -1.75
OD_SPHEQUIV: -0.75

## 2023-02-09 ENCOUNTER — DOCTOR'S OFFICE (OUTPATIENT)
Dept: URBAN - NONMETROPOLITAN AREA CLINIC 1 | Facility: CLINIC | Age: 73
Setting detail: OPHTHALMOLOGY
End: 2023-02-09
Payer: MEDICARE

## 2023-02-09 DIAGNOSIS — H04.123: ICD-10-CM

## 2023-02-09 DIAGNOSIS — H35.81: ICD-10-CM

## 2023-02-09 DIAGNOSIS — H35.372: ICD-10-CM

## 2023-02-09 DIAGNOSIS — H35.343: ICD-10-CM

## 2023-02-09 DIAGNOSIS — H04.122: ICD-10-CM

## 2023-02-09 DIAGNOSIS — H35.3132: ICD-10-CM

## 2023-02-09 PROCEDURE — 99213 OFFICE O/P EST LOW 20 MIN: CPT | Performed by: OPHTHALMOLOGY

## 2023-02-09 PROCEDURE — 83861 MICROFLUID ANALY TEARS: CPT | Performed by: OPHTHALMOLOGY

## 2023-02-09 PROCEDURE — 92202 OPSCPY EXTND ON/MAC DRAW: CPT | Performed by: OPHTHALMOLOGY

## 2023-02-09 PROCEDURE — 92134 CPTRZ OPH DX IMG PST SGM RTA: CPT | Performed by: OPHTHALMOLOGY

## 2023-02-09 ASSESSMENT — VISUAL ACUITY
OS_BCVA: 20/20
OD_BCVA: 20/25

## 2023-02-09 ASSESSMENT — REFRACTION_AUTOREFRACTION
OD_CYLINDER: -1.50
OS_CYLINDER: -0.50
OD_AXIS: 70
OS_SPHERE: -1.50
OD_SPHERE: 0.00
OS_AXIS: 30

## 2023-02-09 ASSESSMENT — SPHEQUIV_DERIVED
OS_SPHEQUIV: -1.75
OD_SPHEQUIV: -0.75

## 2023-02-09 ASSESSMENT — CONFRONTATIONAL VISUAL FIELD TEST (CVF)
OS_FINDINGS: FULL
OD_FINDINGS: FULL

## 2023-03-30 ENCOUNTER — DOCTOR'S OFFICE (OUTPATIENT)
Dept: URBAN - NONMETROPOLITAN AREA CLINIC 1 | Facility: CLINIC | Age: 73
Setting detail: OPHTHALMOLOGY
End: 2023-03-30
Payer: MEDICARE

## 2023-03-30 DIAGNOSIS — H35.372: ICD-10-CM

## 2023-03-30 DIAGNOSIS — H04.123: ICD-10-CM

## 2023-03-30 DIAGNOSIS — H35.3132: ICD-10-CM

## 2023-03-30 DIAGNOSIS — H35.81: ICD-10-CM

## 2023-03-30 DIAGNOSIS — H35.343: ICD-10-CM

## 2023-03-30 PROBLEM — H04.121 DRY EYE; RIGHT EYE, LEFT EYE: Status: ACTIVE | Noted: 2023-03-30

## 2023-03-30 PROBLEM — H04.122 DRY EYE; RIGHT EYE, LEFT EYE: Status: ACTIVE | Noted: 2023-03-30

## 2023-03-30 PROCEDURE — 83861 MICROFLUID ANALY TEARS: CPT | Performed by: OPHTHALMOLOGY

## 2023-03-30 PROCEDURE — 92134 CPTRZ OPH DX IMG PST SGM RTA: CPT | Performed by: OPHTHALMOLOGY

## 2023-03-30 PROCEDURE — 92014 COMPRE OPH EXAM EST PT 1/>: CPT | Performed by: OPHTHALMOLOGY

## 2023-03-30 ASSESSMENT — REFRACTION_AUTOREFRACTION
OS_AXIS: 30
OS_CYLINDER: -0.50
OS_SPHERE: -1.50
OD_CYLINDER: -1.50
OD_SPHERE: 0.00
OD_AXIS: 70

## 2023-03-30 ASSESSMENT — SPHEQUIV_DERIVED
OD_SPHEQUIV: -0.75
OS_SPHEQUIV: -1.75

## 2023-03-30 ASSESSMENT — CONFRONTATIONAL VISUAL FIELD TEST (CVF)
OD_FINDINGS: FULL
OS_FINDINGS: FULL

## 2023-03-30 ASSESSMENT — VISUAL ACUITY
OS_BCVA: 20/20
OD_BCVA: 20/25

## 2023-04-13 ENCOUNTER — DOCTOR'S OFFICE (OUTPATIENT)
Dept: URBAN - NONMETROPOLITAN AREA CLINIC 1 | Facility: CLINIC | Age: 73
Setting detail: OPHTHALMOLOGY
End: 2023-04-13
Payer: MEDICARE

## 2023-04-13 DIAGNOSIS — H35.372: ICD-10-CM

## 2023-04-13 DIAGNOSIS — H35.81: ICD-10-CM

## 2023-04-13 PROCEDURE — 92250 FUNDUS PHOTOGRAPHY W/I&R: CPT | Performed by: OPHTHALMOLOGY

## 2023-04-13 PROCEDURE — 99213 OFFICE O/P EST LOW 20 MIN: CPT | Performed by: OPHTHALMOLOGY

## 2023-04-13 PROCEDURE — 92235 FLUORESCEIN ANGRPH MLTIFRAME: CPT | Performed by: OPHTHALMOLOGY

## 2023-04-13 ASSESSMENT — VISUAL ACUITY
OD_BCVA: 20/25
OS_BCVA: 20/20-1

## 2023-04-13 ASSESSMENT — REFRACTION_AUTOREFRACTION
OD_CYLINDER: -1.50
OS_SPHERE: -1.50
OD_SPHERE: 0.00
OS_AXIS: 30
OD_AXIS: 70
OS_CYLINDER: -0.50

## 2023-04-13 ASSESSMENT — SPHEQUIV_DERIVED
OS_SPHEQUIV: -1.75
OD_SPHEQUIV: -0.75

## 2023-06-19 ENCOUNTER — DOCTOR'S OFFICE (OUTPATIENT)
Dept: URBAN - NONMETROPOLITAN AREA CLINIC 1 | Facility: CLINIC | Age: 73
Setting detail: OPHTHALMOLOGY
End: 2023-06-19
Payer: MEDICARE

## 2023-06-19 DIAGNOSIS — H35.81: ICD-10-CM

## 2023-06-19 DIAGNOSIS — H43.822: ICD-10-CM

## 2023-06-19 DIAGNOSIS — H35.343: ICD-10-CM

## 2023-06-19 DIAGNOSIS — H35.3132: ICD-10-CM

## 2023-06-19 DIAGNOSIS — H40.053: ICD-10-CM

## 2023-06-19 DIAGNOSIS — H35.372: ICD-10-CM

## 2023-06-19 PROCEDURE — 99214 OFFICE O/P EST MOD 30 MIN: CPT | Performed by: OPHTHALMOLOGY

## 2023-06-19 PROCEDURE — 92134 CPTRZ OPH DX IMG PST SGM RTA: CPT | Performed by: OPHTHALMOLOGY

## 2023-06-19 ASSESSMENT — CONFRONTATIONAL VISUAL FIELD TEST (CVF)
OD_FINDINGS: FULL
OS_FINDINGS: FULL

## 2023-06-19 ASSESSMENT — VISUAL ACUITY
OS_BCVA: 20/20
OD_BCVA: 20/20-1

## 2023-06-19 ASSESSMENT — SPHEQUIV_DERIVED
OS_SPHEQUIV: -1.75
OD_SPHEQUIV: -0.75

## 2023-06-19 ASSESSMENT — REFRACTION_AUTOREFRACTION
OD_CYLINDER: -1.50
OS_SPHERE: -1.50
OS_CYLINDER: -0.50
OD_SPHERE: 0.00
OS_AXIS: 30
OD_AXIS: 70

## 2023-07-07 ENCOUNTER — OFFICE VISIT (OUTPATIENT)
Dept: URGENT CARE | Facility: CLINIC | Age: 73
End: 2023-07-07
Payer: COMMERCIAL

## 2023-07-07 VITALS
RESPIRATION RATE: 18 BRPM | HEART RATE: 96 BPM | DIASTOLIC BLOOD PRESSURE: 82 MMHG | SYSTOLIC BLOOD PRESSURE: 142 MMHG | OXYGEN SATURATION: 96 % | WEIGHT: 275 LBS | TEMPERATURE: 97.7 F

## 2023-07-07 DIAGNOSIS — L03.115 CELLULITIS AND ABSCESS OF RIGHT LEG: Primary | ICD-10-CM

## 2023-07-07 DIAGNOSIS — L02.415 CELLULITIS AND ABSCESS OF RIGHT LEG: Primary | ICD-10-CM

## 2023-07-07 PROBLEM — I10 ESSENTIAL HYPERTENSION WITH GOAL BLOOD PRESSURE LESS THAN 140/90: Status: ACTIVE | Noted: 2017-12-18

## 2023-07-07 PROCEDURE — G0382 LEV 3 HOSP TYPE B ED VISIT: HCPCS | Performed by: PHYSICIAN ASSISTANT

## 2023-07-07 RX ORDER — BROMFENAC SODIUM 0.7 MG/ML
SOLUTION/ DROPS OPHTHALMIC
COMMUNITY
Start: 2023-05-02

## 2023-07-07 RX ORDER — CEPHALEXIN 500 MG/1
500 CAPSULE ORAL EVERY 6 HOURS SCHEDULED
Qty: 28 CAPSULE | Refills: 0 | Status: SHIPPED | OUTPATIENT
Start: 2023-07-07 | End: 2023-07-14

## 2023-07-07 RX ORDER — AMLODIPINE BESYLATE 5 MG/1
TABLET ORAL
COMMUNITY
Start: 2023-04-29

## 2023-07-07 RX ORDER — ATORVASTATIN CALCIUM 20 MG/1
TABLET, FILM COATED ORAL
COMMUNITY
Start: 2023-04-29

## 2023-07-07 RX ORDER — VALSARTAN 160 MG/1
TABLET ORAL
COMMUNITY
Start: 2023-06-04

## 2023-07-07 RX ORDER — METOPROLOL SUCCINATE 50 MG/1
TABLET, EXTENDED RELEASE ORAL
COMMUNITY
Start: 2023-05-30

## 2023-07-07 NOTE — PROGRESS NOTES
[facility]        NAME: Destiny Altamirano is a 68 y.o. female  : 1950    MRN: [id number]  DATE: 2023  TIME: 2:04 PM    Assessment and Plan   Cellulitis and abscess of right leg [L03.115, L02.415]  1. Cellulitis and abscess of right leg  cephalexin (KEFLEX) 500 mg capsule            Patient Instructions   Patient Instructions     Cellulitis   WHAT YOU NEED TO KNOW:   Cellulitis is a skin infection caused by bacteria. Cellulitis is common and can become severe. Cellulitis usually appears on the lower legs. It can also appear on the arms, face, and other areas. Cellulitis develops when bacteria enter a crack or break in your skin, such as a scratch, bite, or cut. DISCHARGE INSTRUCTIONS:   Return to the emergency department if:   • Your wound gets larger and more painful. • You feel a crackling under your skin when you touch it. • You have purple dots or bumps on your skin, or you see bleeding under your skin. • You see red streaks coming from the infected area. Call your doctor if:   • The red, warm, swollen area gets larger. • Your fever or pain does not go away or gets worse. • The area does not get smaller after 3 days of antibiotics. • You have questions or concerns about your condition or care. Medicines: You should start to see improvement in 3 days. If cellulitis is not treated, the infection can spread through your body and become life-threatening. You may need any of the following medicines:  • Antibiotics  help treat a bacterial infection. • Acetaminophen  decreases pain and fever. It is available without a doctor's order. Ask how much to take and how often to take it. Follow directions. Read the labels of all other medicines you are using to see if they also contain acetaminophen, or ask your doctor or pharmacist. Acetaminophen can cause liver damage if not taken correctly. • NSAIDs , such as ibuprofen, help decrease swelling, pain, and fever.  This medicine is available with or without a doctor's order. NSAIDs can cause stomach bleeding or kidney problems in certain people. If you take blood thinner medicine, always ask your healthcare provider if NSAIDs are safe for you. Always read the medicine label and follow directions. • Take your medicine as directed. Contact your healthcare provider if you think your medicine is not helping or if you have side effects. Tell your provider if you are allergic to any medicine. Keep a list of the medicines, vitamins, and herbs you take. Include the amounts, and when and why you take them. Bring the list or the pill bottles to follow-up visits. Carry your medicine list with you in case of an emergency. Self-care:   • Wash the area with soap and water every day. Gently pat dry. Use bandages if directed by your healthcare provider. • Apply cream or ointment as directed. These help protect the area. Most over-the-counter products, such as petroleum jelly, are good to use. Ask your healthcare provider about specific creams or ointments you should use. • Place a cool, damp cloth on the area. Use clean cloths and clean water. You can do this as often as you need to. Cool, damp cloths may help decrease pain. • Elevate the area above the level of your heart  as often as you can. This will help decrease swelling and pain. Prop the area on pillows or blankets to keep it elevated comfortably. Prevent cellulitis:   • Do not scratch bug bites or areas of injury. You increase your risk for cellulitis by scratching these areas. • Do not share personal items, such as towels, clothing, and razors. • Clean exercise equipment  with germ-killing  before and after you use it. • Treat athlete's foot. This can help prevent the spread of a bacterial skin infection. • Wash your hands often. Use soap and water. Wash your hands after you use the bathroom, change a child's diapers, or sneeze.  Wash your hands before you prepare or eat food. Use lotion to prevent dry, cracked skin. Follow up with your doctor within 3 days, or as directed:  He or she will check if your cellulitis is getting better. Write down your questions so you remember to ask them during your visits. © Copyright [employer] 2022 Information is for End User's use only and may not be sold, redistributed or otherwise used for commercial purposes. The above information is an  only. It is not intended as medical advice for individual conditions or treatments. Talk to your doctor, nurse or pharmacist before following any medical regimen to see if it is safe and effective for you. Follow up with PCP in 3-5 days. Proceed to  ER if symptoms worsen. Chief Complaint     Chief Complaint   Patient presents with   • Rash     Right leg          History of Present Illness       The patient presents the clinic complaining of redness of the right lower extremity x 2 days. She thought that she was bitten by an insect approximately 2 days ago noticed redness and itching on her posterior right lower extremity. She states that she noticed increased redness and clear drainage over the last 24 hours. She does have a history of swelling of the right lower extremity. She denies history of DVT. Review of Systems   Review of Systems   Constitutional: Negative for chills and fatigue. Musculoskeletal: Positive for arthralgias. Negative for back pain, gait problem, joint swelling, myalgias, neck pain and neck stiffness. Skin: Positive for rash and wound. Negative for color change and pallor. Neurological: Negative for dizziness, tremors, seizures, syncope, speech difficulty, weakness, numbness and headaches.          Current Medications       Current Outpatient Medications:   •  cephalexin (KEFLEX) 500 mg capsule, Take 1 capsule (500 mg total) by mouth every 6 (six) hours for 7 days, Disp: 28 capsule, Rfl: 0  •  amLODIPine (NORVASC) 5 mg tablet, , Disp: , Rfl:   •  atorvastatin (LIPITOR) 20 mg tablet, , Disp: , Rfl:   •  Cholecalciferol 25 MCG (1000 UT) capsule, Take by mouth, Disp: , Rfl:   •  metoprolol succinate (TOPROL-XL) 50 mg 24 hr tablet, , Disp: , Rfl:   •  Prolensa 0.07 % SOLN, , Disp: , Rfl:   •  Turmeric Curcumin 500 MG CAPS, Take by mouth, Disp: , Rfl:   •  valsartan (DIOVAN) 160 mg tablet, , Disp: , Rfl:     Current Allergies     Allergies as of 07/07/2023   • (No Known Allergies)            The following portions of the patient's history were reviewed and updated as appropriate: allergies, current medications, past family history, past medical history, past social history, past surgical history and problem list.     History reviewed. No pertinent past medical history. Past Surgical History:   Procedure Laterality Date   • HYSTERECTOMY         History reviewed. No pertinent family history. Medications have been verified. Objective   /82   Pulse 96   Temp 97.7 °F (36.5 °C)   Resp 18   Wt 125 kg (275 lb)   SpO2 96%        Physical Exam     Physical Exam  Musculoskeletal:      Right lower leg: Swelling present. Edema present. Right ankle: Swelling present. Right Achilles Tendon: No tenderness. Persaud's test negative. Right foot: No swelling or laceration. Legs:       Comments: There is a wound noted on the right lower extremity that is approximately 4 cm x 3 cm in size. The area is warm to the touch with bullae noted in the center. There is surrounding erythema that is approximately 5 cm x 6 cm in size. The right calf measures 19 inches. Left calf measures 17 inches. The patient may have developing cellulitis therefore I will treat with Keflex. She is aware that we cannot rule out DVT without a venous Doppler. I suggest follow-up with her PCP or go to the ER if symptoms worsen.

## 2023-07-07 NOTE — PATIENT INSTRUCTIONS
Cellulitis   WHAT YOU NEED TO KNOW:   Cellulitis is a skin infection caused by bacteria. Cellulitis is common and can become severe. Cellulitis usually appears on the lower legs. It can also appear on the arms, face, and other areas. Cellulitis develops when bacteria enter a crack or break in your skin, such as a scratch, bite, or cut. DISCHARGE INSTRUCTIONS:   Return to the emergency department if:   Your wound gets larger and more painful. You feel a crackling under your skin when you touch it. You have purple dots or bumps on your skin, or you see bleeding under your skin. You see red streaks coming from the infected area. Call your doctor if:   The red, warm, swollen area gets larger. Your fever or pain does not go away or gets worse. The area does not get smaller after 3 days of antibiotics. You have questions or concerns about your condition or care. Medicines: You should start to see improvement in 3 days. If cellulitis is not treated, the infection can spread through your body and become life-threatening. You may need any of the following medicines:  Antibiotics  help treat a bacterial infection. Acetaminophen  decreases pain and fever. It is available without a doctor's order. Ask how much to take and how often to take it. Follow directions. Read the labels of all other medicines you are using to see if they also contain acetaminophen, or ask your doctor or pharmacist. Acetaminophen can cause liver damage if not taken correctly. NSAIDs , such as ibuprofen, help decrease swelling, pain, and fever. This medicine is available with or without a doctor's order. NSAIDs can cause stomach bleeding or kidney problems in certain people. If you take blood thinner medicine, always ask your healthcare provider if NSAIDs are safe for you. Always read the medicine label and follow directions. Take your medicine as directed.   Contact your healthcare provider if you think your medicine is not helping or if you have side effects. Tell your provider if you are allergic to any medicine. Keep a list of the medicines, vitamins, and herbs you take. Include the amounts, and when and why you take them. Bring the list or the pill bottles to follow-up visits. Carry your medicine list with you in case of an emergency. Self-care:   Wash the area with soap and water every day. Gently pat dry. Use bandages if directed by your healthcare provider. Apply cream or ointment as directed. These help protect the area. Most over-the-counter products, such as petroleum jelly, are good to use. Ask your healthcare provider about specific creams or ointments you should use. Place a cool, damp cloth on the area. Use clean cloths and clean water. You can do this as often as you need to. Cool, damp cloths may help decrease pain. Elevate the area above the level of your heart  as often as you can. This will help decrease swelling and pain. Prop the area on pillows or blankets to keep it elevated comfortably. Prevent cellulitis:   Do not scratch bug bites or areas of injury. You increase your risk for cellulitis by scratching these areas. Do not share personal items, such as towels, clothing, and razors. Clean exercise equipment  with germ-killing  before and after you use it. Treat athlete's foot. This can help prevent the spread of a bacterial skin infection. Wash your hands often. Use soap and water. Wash your hands after you use the bathroom, change a child's diapers, or sneeze. Wash your hands before you prepare or eat food. Use lotion to prevent dry, cracked skin. Follow up with your doctor within 3 days, or as directed:  He or she will check if your cellulitis is getting better. Write down your questions so you remember to ask them during your visits.   © Copyright [employer] 2022 Information is for End User's use only and may not be sold, redistributed or otherwise used for commercial purposes. The above information is an  only. It is not intended as medical advice for individual conditions or treatments. Talk to your doctor, nurse or pharmacist before following any medical regimen to see if it is safe and effective for you.

## 2023-08-24 ENCOUNTER — DOCTOR'S OFFICE (OUTPATIENT)
Dept: URBAN - NONMETROPOLITAN AREA CLINIC 1 | Facility: CLINIC | Age: 73
Setting detail: OPHTHALMOLOGY
End: 2023-08-24
Payer: MEDICARE

## 2023-08-24 DIAGNOSIS — H35.343: ICD-10-CM

## 2023-08-24 DIAGNOSIS — H35.372: ICD-10-CM

## 2023-08-24 DIAGNOSIS — H04.123: ICD-10-CM

## 2023-08-24 DIAGNOSIS — H35.81: ICD-10-CM

## 2023-08-24 DIAGNOSIS — H43.822: ICD-10-CM

## 2023-08-24 PROCEDURE — 92134 CPTRZ OPH DX IMG PST SGM RTA: CPT | Performed by: OPHTHALMOLOGY

## 2023-08-24 PROCEDURE — 83861 MICROFLUID ANALY TEARS: CPT | Performed by: OPHTHALMOLOGY

## 2023-08-24 PROCEDURE — 99214 OFFICE O/P EST MOD 30 MIN: CPT | Performed by: OPHTHALMOLOGY

## 2023-08-24 ASSESSMENT — REFRACTION_AUTOREFRACTION
OD_SPHERE: 0.00
OS_AXIS: 30
OS_SPHERE: -1.50
OD_AXIS: 70
OS_CYLINDER: -0.50
OD_CYLINDER: -1.50

## 2023-08-24 ASSESSMENT — SPHEQUIV_DERIVED
OS_SPHEQUIV: -1.75
OD_SPHEQUIV: -0.75

## 2023-08-24 ASSESSMENT — VISUAL ACUITY
OS_BCVA: 20/20
OD_BCVA: 20/20-2

## 2023-08-24 ASSESSMENT — CONFRONTATIONAL VISUAL FIELD TEST (CVF)
OS_FINDINGS: FULL
OD_FINDINGS: FULL

## 2023-11-13 ENCOUNTER — DOCTOR'S OFFICE (OUTPATIENT)
Dept: URBAN - NONMETROPOLITAN AREA CLINIC 1 | Facility: CLINIC | Age: 73
Setting detail: OPHTHALMOLOGY
End: 2023-11-13
Payer: MEDICARE

## 2023-11-13 DIAGNOSIS — H35.372: ICD-10-CM

## 2023-11-13 DIAGNOSIS — H04.123: ICD-10-CM

## 2023-11-13 DIAGNOSIS — H35.81: ICD-10-CM

## 2023-11-13 DIAGNOSIS — H35.343: ICD-10-CM

## 2023-11-13 DIAGNOSIS — H43.822: ICD-10-CM

## 2023-11-13 PROCEDURE — 92134 CPTRZ OPH DX IMG PST SGM RTA: CPT | Performed by: OPHTHALMOLOGY

## 2023-11-13 PROCEDURE — 83861 MICROFLUID ANALY TEARS: CPT | Mod: QW,LT | Performed by: OPHTHALMOLOGY

## 2023-11-13 PROCEDURE — 83861 MICROFLUID ANALY TEARS: CPT | Mod: QW,RT | Performed by: OPHTHALMOLOGY

## 2023-11-13 PROCEDURE — 99214 OFFICE O/P EST MOD 30 MIN: CPT | Performed by: OPHTHALMOLOGY

## 2023-11-13 ASSESSMENT — SPHEQUIV_DERIVED
OD_SPHEQUIV: -0.75
OS_SPHEQUIV: -1.75

## 2023-11-13 ASSESSMENT — REFRACTION_AUTOREFRACTION
OS_SPHERE: -1.50
OD_AXIS: 70
OS_AXIS: 30
OS_CYLINDER: -0.50
OD_CYLINDER: -1.50
OD_SPHERE: 0.00

## 2023-11-13 ASSESSMENT — CONFRONTATIONAL VISUAL FIELD TEST (CVF)
OS_FINDINGS: FULL
OD_FINDINGS: FULL

## 2023-11-13 ASSESSMENT — VISUAL ACUITY
OS_BCVA: 20/20
OD_BCVA: 20/20

## 2024-02-15 ENCOUNTER — DOCTOR'S OFFICE (OUTPATIENT)
Dept: URBAN - NONMETROPOLITAN AREA CLINIC 1 | Facility: CLINIC | Age: 74
Setting detail: OPHTHALMOLOGY
End: 2024-02-15
Payer: MEDICARE

## 2024-02-15 DIAGNOSIS — H35.343: ICD-10-CM

## 2024-02-15 DIAGNOSIS — H43.822: ICD-10-CM

## 2024-02-15 DIAGNOSIS — H35.81: ICD-10-CM

## 2024-02-15 DIAGNOSIS — H35.372: ICD-10-CM

## 2024-02-15 PROCEDURE — 99213 OFFICE O/P EST LOW 20 MIN: CPT | Performed by: OPHTHALMOLOGY

## 2024-02-15 PROCEDURE — 92250 FUNDUS PHOTOGRAPHY W/I&R: CPT | Performed by: OPHTHALMOLOGY

## 2024-02-15 ASSESSMENT — CONFRONTATIONAL VISUAL FIELD TEST (CVF)
OS_FINDINGS: FULL
OD_FINDINGS: FULL

## 2024-05-31 PROBLEM — H43.393 VITREOUS FLOATERS; BOTH EYES: Status: ACTIVE | Noted: 2024-05-31

## 2024-06-12 ENCOUNTER — OFFICE VISIT (OUTPATIENT)
Dept: URGENT CARE | Facility: CLINIC | Age: 74
End: 2024-06-12
Payer: COMMERCIAL

## 2024-06-12 VITALS
HEART RATE: 110 BPM | OXYGEN SATURATION: 97 % | SYSTOLIC BLOOD PRESSURE: 192 MMHG | TEMPERATURE: 98.8 F | RESPIRATION RATE: 20 BRPM | DIASTOLIC BLOOD PRESSURE: 99 MMHG

## 2024-06-12 DIAGNOSIS — J02.0 STREP PHARYNGITIS: Primary | ICD-10-CM

## 2024-06-12 LAB — S PYO AG THROAT QL: POSITIVE

## 2024-06-12 PROCEDURE — G0382 LEV 3 HOSP TYPE B ED VISIT: HCPCS

## 2024-06-12 PROCEDURE — 87880 STREP A ASSAY W/OPTIC: CPT

## 2024-06-12 RX ORDER — AMOXICILLIN 500 MG/1
1000 CAPSULE ORAL EVERY 24 HOURS
Qty: 20 CAPSULE | Refills: 0 | Status: SHIPPED | OUTPATIENT
Start: 2024-06-12 | End: 2024-06-22

## 2024-06-17 NOTE — PROGRESS NOTES
[facility]        NAME: Danae Yuen is a 73 y.o. female  : 1950    MRN: [id number]  DATE: 2024  TIME: 10:03 AM    Assessment and Plan   Strep pharyngitis [J02.0]  1. Strep pharyngitis  POCT rapid ANTIGEN strepA    amoxicillin (AMOXIL) 500 mg capsule        Rapid strep positive.  Will treat with amoxicillin    Patient Instructions   Take antibiotics as directed. Take entire duration, do not stop antibiotic just because your symptoms have resolved. Avoid milk products, eat softer foods. Warm salt water rinses. Honey with hot tea. Cool or warm fluid may be soothing. Avoid sharing drinks/utensils. Proper hand hygiene. Dispose of toothbrush after 48 hours of antibiotics. No school for 24 hours. Treat fever with alternating tylenol and motrin. If symptoms worsen proceed to ED. Follow with PCP    Follow up with PCP in 3-5 days.  Proceed to  ER if symptoms worsen.    Chief Complaint     Chief Complaint   Patient presents with    Sore Throat     Started late  night.  Using Ibu and Mucinex throat drops and using salt water         History of Present Illness       Patient is a 73 year old female who presents to the office today for a sore throat since Saturday. Has been using ibuprofen, throat drops and mucinex. Has a vacation coming up and would like to check for strep. Denies drooling or trouble swallowing.         Review of Systems   Review of Systems   Constitutional:  Negative for chills and fever.   HENT:  Positive for sore throat. Negative for congestion, trouble swallowing and voice change.    Respiratory:  Negative for cough.    All other systems reviewed and are negative.        Current Medications       Current Outpatient Medications:     amLODIPine (NORVASC) 5 mg tablet, , Disp: , Rfl:     amoxicillin (AMOXIL) 500 mg capsule, Take 2 capsules (1,000 mg total) by mouth every 24 hours for 10 days, Disp: 20 capsule, Rfl: 0    atorvastatin (LIPITOR) 20 mg tablet, , Disp: , Rfl:      Cholecalciferol 25 MCG (1000 UT) capsule, Take by mouth, Disp: , Rfl:     metoprolol succinate (TOPROL-XL) 50 mg 24 hr tablet, , Disp: , Rfl:     Prolensa 0.07 % SOLN, , Disp: , Rfl:     Turmeric Curcumin 500 MG CAPS, Take by mouth, Disp: , Rfl:     valsartan (DIOVAN) 160 mg tablet, , Disp: , Rfl:     Current Allergies     Allergies as of 06/12/2024    (No Known Allergies)            The following portions of the patient's history were reviewed and updated as appropriate: allergies, current medications, past family history, past medical history, past social history, past surgical history and problem list.     History reviewed. No pertinent past medical history.    Past Surgical History:   Procedure Laterality Date    HYSTERECTOMY         History reviewed. No pertinent family history.      Medications have been verified.        Objective   BP (!) 192/99   Pulse (!) 110   Temp 98.8 °F (37.1 °C)   Resp 20   SpO2 97%        Physical Exam     Physical Exam  Vitals and nursing note reviewed.   Constitutional:       General: She is not in acute distress.     Appearance: She is well-developed and normal weight. She is not ill-appearing or toxic-appearing.   HENT:      Right Ear: Tympanic membrane normal.      Left Ear: Tympanic membrane normal.      Nose: No congestion or rhinorrhea.      Mouth/Throat:      Mouth: Mucous membranes are moist.      Pharynx: Posterior oropharyngeal erythema present.      Tonsils: Tonsillar exudate present. 3+ on the right. 3+ on the left.   Cardiovascular:      Rate and Rhythm: Regular rhythm. Tachycardia present.      Heart sounds: Normal heart sounds.   Pulmonary:      Effort: Pulmonary effort is normal.      Breath sounds: Normal breath sounds.   Abdominal:      Palpations: Abdomen is soft.   Lymphadenopathy:      Cervical: Cervical adenopathy present.   Skin:     General: Skin is warm.      Capillary Refill: Capillary refill takes less than 2 seconds.   Neurological:      Mental  Status: She is alert.

## 2024-07-11 ENCOUNTER — DOCTOR'S OFFICE (OUTPATIENT)
Dept: URBAN - NONMETROPOLITAN AREA CLINIC 1 | Facility: CLINIC | Age: 74
Setting detail: OPHTHALMOLOGY
End: 2024-07-11
Payer: MEDICARE

## 2024-07-11 DIAGNOSIS — H35.81: ICD-10-CM

## 2024-07-11 DIAGNOSIS — H35.372: ICD-10-CM

## 2024-07-11 DIAGNOSIS — H35.343: ICD-10-CM

## 2024-07-11 DIAGNOSIS — H43.822: ICD-10-CM

## 2024-07-11 PROCEDURE — 92235 FLUORESCEIN ANGRPH MLTIFRAME: CPT | Performed by: OPHTHALMOLOGY

## 2024-07-11 PROCEDURE — 99213 OFFICE O/P EST LOW 20 MIN: CPT | Performed by: OPHTHALMOLOGY

## 2024-07-11 PROCEDURE — 92250 FUNDUS PHOTOGRAPHY W/I&R: CPT | Performed by: OPHTHALMOLOGY

## 2024-07-11 ASSESSMENT — CONFRONTATIONAL VISUAL FIELD TEST (CVF)
OS_FINDINGS: FULL
OD_FINDINGS: FULL

## 2024-08-19 ENCOUNTER — DOCTOR'S OFFICE (OUTPATIENT)
Dept: URBAN - NONMETROPOLITAN AREA CLINIC 1 | Facility: CLINIC | Age: 74
Setting detail: OPHTHALMOLOGY
End: 2024-08-19
Payer: MEDICARE

## 2024-08-19 DIAGNOSIS — H35.81: ICD-10-CM

## 2024-08-19 DIAGNOSIS — H43.822: ICD-10-CM

## 2024-08-19 DIAGNOSIS — H35.343: ICD-10-CM

## 2024-08-19 DIAGNOSIS — H35.372: ICD-10-CM

## 2024-08-19 PROCEDURE — 92012 INTRM OPH EXAM EST PATIENT: CPT | Performed by: OPHTHALMOLOGY

## 2024-08-19 PROCEDURE — 92134 CPTRZ OPH DX IMG PST SGM RTA: CPT | Performed by: OPHTHALMOLOGY

## 2024-10-17 ENCOUNTER — DOCTOR'S OFFICE (OUTPATIENT)
Dept: URBAN - NONMETROPOLITAN AREA CLINIC 1 | Facility: CLINIC | Age: 74
Setting detail: OPHTHALMOLOGY
End: 2024-10-17
Payer: MEDICARE

## 2024-10-17 DIAGNOSIS — H35.81: ICD-10-CM

## 2024-10-17 DIAGNOSIS — H35.372: ICD-10-CM

## 2024-10-17 DIAGNOSIS — H43.822: ICD-10-CM

## 2024-10-17 DIAGNOSIS — H35.343: ICD-10-CM

## 2024-10-17 PROCEDURE — 92012 INTRM OPH EXAM EST PATIENT: CPT | Performed by: OPHTHALMOLOGY

## 2024-10-17 PROCEDURE — 92134 CPTRZ OPH DX IMG PST SGM RTA: CPT | Performed by: OPHTHALMOLOGY

## 2024-10-17 ASSESSMENT — REFRACTION_AUTOREFRACTION
OS_CYLINDER: -0.50
OS_SPHERE: -1.50
OD_SPHERE: 0.00
OS_AXIS: 30
OD_CYLINDER: -1.50
OD_AXIS: 70

## 2024-10-17 ASSESSMENT — CONFRONTATIONAL VISUAL FIELD TEST (CVF)
OS_FINDINGS: FULL
OD_FINDINGS: FULL

## 2024-10-17 ASSESSMENT — VISUAL ACUITY
OD_BCVA: 20/20-2
OS_BCVA: 20/20

## 2024-12-19 ENCOUNTER — DOCTOR'S OFFICE (OUTPATIENT)
Dept: URBAN - NONMETROPOLITAN AREA CLINIC 1 | Facility: CLINIC | Age: 74
Setting detail: OPHTHALMOLOGY
End: 2024-12-19
Payer: MEDICARE

## 2024-12-19 DIAGNOSIS — H35.412: ICD-10-CM

## 2024-12-19 DIAGNOSIS — H43.822: ICD-10-CM

## 2024-12-19 DIAGNOSIS — H35.342: ICD-10-CM

## 2024-12-19 DIAGNOSIS — H35.81: ICD-10-CM

## 2024-12-19 DIAGNOSIS — H35.372: ICD-10-CM

## 2024-12-19 DIAGNOSIS — H43.813: ICD-10-CM

## 2024-12-19 DIAGNOSIS — H40.053: ICD-10-CM

## 2024-12-19 DIAGNOSIS — H35.3132: ICD-10-CM

## 2024-12-19 PROCEDURE — 92134 CPTRZ OPH DX IMG PST SGM RTA: CPT | Performed by: OPHTHALMOLOGY

## 2024-12-19 PROCEDURE — 99213 OFFICE O/P EST LOW 20 MIN: CPT | Performed by: OPHTHALMOLOGY

## 2024-12-19 PROCEDURE — 92201 OPSCPY EXTND RTA DRAW UNI/BI: CPT | Performed by: OPHTHALMOLOGY

## 2024-12-19 ASSESSMENT — REFRACTION_AUTOREFRACTION
OS_CYLINDER: -0.50
OD_SPHERE: 0.00
OS_AXIS: 30
OD_CYLINDER: -1.50
OS_SPHERE: -1.50
OD_AXIS: 70

## 2024-12-19 ASSESSMENT — VISUAL ACUITY
OD_BCVA: 20/25+2
OS_BCVA: 20/20

## 2024-12-19 ASSESSMENT — CONFRONTATIONAL VISUAL FIELD TEST (CVF)
OS_FINDINGS: FULL
OD_FINDINGS: FULL

## 2025-02-17 ENCOUNTER — DOCTOR'S OFFICE (OUTPATIENT)
Dept: URBAN - NONMETROPOLITAN AREA CLINIC 1 | Facility: CLINIC | Age: 75
Setting detail: OPHTHALMOLOGY
End: 2025-02-17
Payer: MEDICARE

## 2025-02-17 DIAGNOSIS — H35.372: ICD-10-CM

## 2025-02-17 DIAGNOSIS — H35.342: ICD-10-CM

## 2025-02-17 DIAGNOSIS — H35.81: ICD-10-CM

## 2025-02-17 DIAGNOSIS — H43.822: ICD-10-CM

## 2025-02-17 PROCEDURE — 92134 CPTRZ OPH DX IMG PST SGM RTA: CPT | Performed by: OPHTHALMOLOGY

## 2025-02-17 PROCEDURE — 99213 OFFICE O/P EST LOW 20 MIN: CPT | Performed by: OPHTHALMOLOGY

## 2025-02-17 ASSESSMENT — REFRACTION_AUTOREFRACTION
OD_SPHERE: 0.00
OD_AXIS: 70
OS_CYLINDER: -0.50
OS_AXIS: 30
OD_CYLINDER: -1.50
OS_SPHERE: -1.50

## 2025-02-17 ASSESSMENT — CONFRONTATIONAL VISUAL FIELD TEST (CVF)
OS_FINDINGS: FULL
OD_FINDINGS: FULL

## 2025-02-17 ASSESSMENT — VISUAL ACUITY
OD_BCVA: 20/25-2
OS_BCVA: 20/25+2

## 2025-04-21 ENCOUNTER — DOCTOR'S OFFICE (OUTPATIENT)
Dept: URBAN - NONMETROPOLITAN AREA CLINIC 1 | Facility: CLINIC | Age: 75
Setting detail: OPHTHALMOLOGY
End: 2025-04-21
Payer: MEDICARE

## 2025-04-21 DIAGNOSIS — H35.372: ICD-10-CM

## 2025-04-21 DIAGNOSIS — H35.81: ICD-10-CM

## 2025-04-21 DIAGNOSIS — H35.342: ICD-10-CM

## 2025-04-21 DIAGNOSIS — H43.822: ICD-10-CM

## 2025-04-21 PROCEDURE — 92134 CPTRZ OPH DX IMG PST SGM RTA: CPT | Performed by: OPHTHALMOLOGY

## 2025-04-21 PROCEDURE — 99213 OFFICE O/P EST LOW 20 MIN: CPT | Performed by: OPHTHALMOLOGY

## 2025-04-21 ASSESSMENT — CONFRONTATIONAL VISUAL FIELD TEST (CVF)
OS_FINDINGS: FULL
OD_FINDINGS: FULL

## 2025-04-21 ASSESSMENT — REFRACTION_AUTOREFRACTION
OD_AXIS: 70
OS_AXIS: 30
OD_SPHERE: 0.00
OS_CYLINDER: -0.50
OS_SPHERE: -1.50
OD_CYLINDER: -1.50

## 2025-04-21 ASSESSMENT — VISUAL ACUITY
OS_BCVA: 20/20
OD_BCVA: 20/25-1

## 2025-04-24 ENCOUNTER — OFFICE VISIT (OUTPATIENT)
Dept: URGENT CARE | Facility: CLINIC | Age: 75
End: 2025-04-24
Payer: COMMERCIAL

## 2025-04-24 VITALS
OXYGEN SATURATION: 98 % | RESPIRATION RATE: 18 BRPM | HEART RATE: 93 BPM | WEIGHT: 255 LBS | BODY MASS INDEX: 38.65 KG/M2 | TEMPERATURE: 98.4 F | HEIGHT: 68 IN

## 2025-04-24 DIAGNOSIS — L08.9 LOCAL INFECTION OF THE SKIN AND SUBCUTANEOUS TISSUE, UNSPECIFIED: ICD-10-CM

## 2025-04-24 DIAGNOSIS — I87.2 VENOUS STASIS DERMATITIS: Primary | ICD-10-CM

## 2025-04-24 PROCEDURE — G0382 LEV 3 HOSP TYPE B ED VISIT: HCPCS | Performed by: PHYSICIAN ASSISTANT

## 2025-04-24 RX ORDER — POLYETHYLENE GLYCOL 400 AND PROPYLENE GLYCOL 4; 3 MG/ML; MG/ML
SOLUTION/ DROPS OPHTHALMIC
COMMUNITY

## 2025-04-24 RX ORDER — TIMOLOL 5 MG/ML
SOLUTION/ DROPS OPHTHALMIC
COMMUNITY

## 2025-04-24 RX ORDER — CEPHALEXIN 500 MG/1
500 CAPSULE ORAL EVERY 8 HOURS SCHEDULED
Qty: 21 CAPSULE | Refills: 0 | Status: SHIPPED | COMMUNITY
Start: 2025-04-24 | End: 2025-05-01

## 2025-04-24 RX ORDER — MV-MN/OM3/DHA/EPA/FISH/LUT/ZEA 250-5-1 MG
CAPSULE ORAL
COMMUNITY

## 2025-04-24 RX ORDER — CITALOPRAM HYDROBROMIDE 40 MG/1
TABLET ORAL
COMMUNITY
Start: 2025-02-07

## 2025-04-24 NOTE — PATIENT INSTRUCTIONS
Keflex as prescribed  Elevation   Compression stockings   Wash with soap and water twice per day, apply lotion (EX: Eucerin/CeraVe/Cetaphi) then petroleum jelly, and cover with ABD pad and coban.  Monitor for signs of new/worsening infection  Follow up with PCP in 3-5 days.  Proceed to  ER if symptoms worsen.    If tests have been performed at [facility], our office will contact you with results if changes need to be made to the care plan discussed with you at the visit.  You can review your full results on [facility] MyChart.

## 2025-04-24 NOTE — PROGRESS NOTES
[facility]        NAME: Danae Yuen is a 74 y.o. female  : 1950    MRN: [id number]  DATE: 2025  TIME: 6:44 PM    Assessment and Plan   Venous stasis dermatitis [I87.2]  1. Venous stasis dermatitis  cephalexin (KEFLEX) 500 mg capsule      2. Local infection of the skin and subcutaneous tissue, unspecified            Results        Patient Instructions     Assessment & Plan    Keflex as prescribed  Elevation   Compression stockings   Wash with soap and water twice per day, apply lotion (EX: Eucerin/CeraVe/Cetaphi) then petroleum jelly, and cover with ABD pad and coban.  Monitor for signs of new/worsening infection  Follow up with PCP in 3-5 days.  Proceed to  ER if symptoms worsen.    If tests have been performed at [facility], our office will contact you with results if changes need to be made to the care plan discussed with you at the visit.  You can review your full results on [facility].    Chief Complaint     Chief Complaint   Patient presents with    Leg Pain         History of Present Illness     History of Present Illness      73 y/o F with PMH of venous stasis presents with worsening b/l leg redness, flaking, drainage, and spreading erythema. She has been wearing compression stockings and applying petroleum jelly to the region. States she is travelling next week. Denies fevers or chills.         Review of Systems   Review of Systems   Constitutional:  Negative for chills and fever.   Skin:  Positive for color change.         Current Medications       Current Outpatient Medications:     amLODIPine (NORVASC) 5 mg tablet, 20 mg, Disp: , Rfl:     atorvastatin (LIPITOR) 20 mg tablet, , Disp: , Rfl:     cephalexin (KEFLEX) 500 mg capsule, Take 1 capsule (500 mg total) by mouth every 8 (eight) hours for 7 days, Disp: 21 capsule, Rfl: 0    Cholecalciferol 25 MCG (1000 UT) capsule, Take by mouth, Disp: , Rfl:     citalopram (CeleXA) 40 mg tablet, Take by mouth, Disp: , Rfl:      "metoprolol succinate (TOPROL-XL) 50 mg 24 hr tablet, , Disp: , Rfl:     Multiple Vitamins-Minerals (Ocuvite Adult 50+) CAPS, Take by mouth, Disp: , Rfl:     polyethylene glycol-propylene glycol (Systane) 0.4-0.3 %, Apply to eye, Disp: , Rfl:     Prolensa 0.07 % SOLN, , Disp: , Rfl:     Timolol Maleate PF 0.5 % SOLN, Apply to eye, Disp: , Rfl:     Turmeric Curcumin 500 MG CAPS, Take by mouth, Disp: , Rfl:     valsartan (DIOVAN) 160 mg tablet, , Disp: , Rfl:     Current Allergies     Allergies as of 04/24/2025    (No Known Allergies)            The following portions of the patient's history were reviewed and updated as appropriate: allergies, current medications, past family history, past medical history, past social history, past surgical history and problem list.     Past Medical History:   Diagnosis Date    Anxiety     Depression     Hyperlipidemia     Hypertension        Past Surgical History:   Procedure Laterality Date    HYSTERECTOMY         Family History   Problem Relation Age of Onset    Hypertension Mother     No Known Problems Father          Medications have been verified.        Objective   Pulse 93   Temp 98.4 °F (36.9 °C)   Resp 18   Ht 5' 8\" (1.727 m)   Wt 116 kg (255 lb)   SpO2 98%   BMI 38.77 kg/m²   No LMP recorded. Patient has had a hysterectomy.       Physical Exam     Physical Exam  Constitutional:       General: She is not in acute distress.     Appearance: She is well-developed.   Cardiovascular:      Rate and Rhythm: Normal rate and regular rhythm.      Heart sounds: Normal heart sounds.   Pulmonary:      Effort: Pulmonary effort is normal. No respiratory distress.      Breath sounds: Normal breath sounds.   Musculoskeletal:         General: Swelling present. Normal range of motion.   Skin:     General: Skin is warm.      Findings: Erythema and rash present.      Comments: Serous drainage    Neurological:      Mental Status: She is alert.      Sensory: No sensory deficit. "   Psychiatric:         Behavior: Behavior normal.         Thought Content: Thought content normal.         Judgment: Judgment normal.                     Area cleaned with wound cleanser spray, ABD pad applied with COBAN.

## 2025-07-28 ENCOUNTER — DOCTOR'S OFFICE (OUTPATIENT)
Dept: URBAN - NONMETROPOLITAN AREA CLINIC 1 | Facility: CLINIC | Age: 75
Setting detail: OPHTHALMOLOGY
End: 2025-07-28
Payer: MEDICARE

## 2025-07-28 DIAGNOSIS — H04.121: ICD-10-CM

## 2025-07-28 DIAGNOSIS — H35.81: ICD-10-CM

## 2025-07-28 DIAGNOSIS — H43.822: ICD-10-CM

## 2025-07-28 DIAGNOSIS — H35.372: ICD-10-CM

## 2025-07-28 DIAGNOSIS — H04.122: ICD-10-CM

## 2025-07-28 DIAGNOSIS — H35.342: ICD-10-CM

## 2025-07-28 PROCEDURE — 92014 COMPRE OPH EXAM EST PT 1/>: CPT | Performed by: OPHTHALMOLOGY

## 2025-07-28 PROCEDURE — 83861 MICROFLUID ANALY TEARS: CPT | Mod: QW,RT | Performed by: OPHTHALMOLOGY

## 2025-07-28 PROCEDURE — 92134 CPTRZ OPH DX IMG PST SGM RTA: CPT | Performed by: OPHTHALMOLOGY

## 2025-07-28 PROCEDURE — 83861 MICROFLUID ANALY TEARS: CPT | Mod: QW,LT | Performed by: OPHTHALMOLOGY

## 2025-07-28 ASSESSMENT — REFRACTION_AUTOREFRACTION
OS_SPHERE: -1.50
OD_AXIS: 70
OD_SPHERE: 0.00
OD_CYLINDER: -1.50
OS_CYLINDER: -0.50
OS_AXIS: 30

## 2025-07-28 ASSESSMENT — VISUAL ACUITY
OS_BCVA: 20/20-2
OD_BCVA: 20/25-1

## 2025-07-28 ASSESSMENT — CONFRONTATIONAL VISUAL FIELD TEST (CVF)
OS_FINDINGS: FULL
OD_FINDINGS: FULL